# Patient Record
Sex: FEMALE | Race: WHITE | NOT HISPANIC OR LATINO | Employment: FULL TIME | URBAN - METROPOLITAN AREA
[De-identification: names, ages, dates, MRNs, and addresses within clinical notes are randomized per-mention and may not be internally consistent; named-entity substitution may affect disease eponyms.]

---

## 2022-01-22 ENCOUNTER — APPOINTMENT (OUTPATIENT)
Dept: ULTRASOUND IMAGING | Facility: CLINIC | Age: 32
End: 2022-01-22
Attending: EMERGENCY MEDICINE
Payer: COMMERCIAL

## 2022-01-22 ENCOUNTER — HOSPITAL ENCOUNTER (EMERGENCY)
Facility: CLINIC | Age: 32
Discharge: HOME | End: 2022-01-22
Attending: EMERGENCY MEDICINE | Admitting: EMERGENCY MEDICINE
Payer: COMMERCIAL

## 2022-01-22 VITALS
HEART RATE: 63 BPM | BODY MASS INDEX: 32.78 KG/M2 | WEIGHT: 185 LBS | RESPIRATION RATE: 18 BRPM | TEMPERATURE: 98.1 F | HEIGHT: 63 IN | SYSTOLIC BLOOD PRESSURE: 97 MMHG | DIASTOLIC BLOOD PRESSURE: 69 MMHG | OXYGEN SATURATION: 100 %

## 2022-01-22 DIAGNOSIS — N93.8 DYSFUNCTIONAL UTERINE BLEEDING: ICD-10-CM

## 2022-01-22 LAB
ANION GAP SERPL CALCULATED.3IONS-SCNC: 6 MMOL/L (ref 3–14)
B-HCG FREE SERPL-ACNC: <5 IU/L (ref 0–5)
BASOPHILS # BLD AUTO: 0 10E3/UL (ref 0–0.2)
BASOPHILS NFR BLD AUTO: 1 %
BUN SERPL-MCNC: 19 MG/DL (ref 7–30)
CALCIUM SERPL-MCNC: 9.5 MG/DL (ref 8.5–10.1)
CHLORIDE BLD-SCNC: 110 MMOL/L (ref 94–109)
CO2 SERPL-SCNC: 25 MMOL/L (ref 20–32)
CREAT SERPL-MCNC: 0.64 MG/DL (ref 0.52–1.04)
EOSINOPHIL # BLD AUTO: 0.1 10E3/UL (ref 0–0.7)
EOSINOPHIL NFR BLD AUTO: 2 %
ERYTHROCYTE [DISTWIDTH] IN BLOOD BY AUTOMATED COUNT: 12.7 % (ref 10–15)
GFR SERPL CREATININE-BSD FRML MDRD: >90 ML/MIN/1.73M2
GLUCOSE BLD-MCNC: 86 MG/DL (ref 70–99)
HCT VFR BLD AUTO: 48.2 % (ref 35–47)
HGB BLD-MCNC: 15.6 G/DL (ref 11.7–15.7)
IMM GRANULOCYTES # BLD: 0 10E3/UL
IMM GRANULOCYTES NFR BLD: 0 %
LYMPHOCYTES # BLD AUTO: 2.5 10E3/UL (ref 0.8–5.3)
LYMPHOCYTES NFR BLD AUTO: 33 %
MCH RBC QN AUTO: 29.7 PG (ref 26.5–33)
MCHC RBC AUTO-ENTMCNC: 32.4 G/DL (ref 31.5–36.5)
MCV RBC AUTO: 92 FL (ref 78–100)
MONOCYTES # BLD AUTO: 0.6 10E3/UL (ref 0–1.3)
MONOCYTES NFR BLD AUTO: 7 %
NEUTROPHILS # BLD AUTO: 4.3 10E3/UL (ref 1.6–8.3)
NEUTROPHILS NFR BLD AUTO: 57 %
NRBC # BLD AUTO: 0 10E3/UL
NRBC BLD AUTO-RTO: 0 /100
PLATELET # BLD AUTO: 267 10E3/UL (ref 150–450)
POTASSIUM BLD-SCNC: 3.8 MMOL/L (ref 3.4–5.3)
RBC # BLD AUTO: 5.25 10E6/UL (ref 3.8–5.2)
SODIUM SERPL-SCNC: 141 MMOL/L (ref 133–144)
WBC # BLD AUTO: 7.5 10E3/UL (ref 4–11)

## 2022-01-22 PROCEDURE — 36415 COLL VENOUS BLD VENIPUNCTURE: CPT | Performed by: EMERGENCY MEDICINE

## 2022-01-22 PROCEDURE — 85025 COMPLETE CBC W/AUTO DIFF WBC: CPT | Performed by: EMERGENCY MEDICINE

## 2022-01-22 PROCEDURE — 99284 EMERGENCY DEPT VISIT MOD MDM: CPT | Mod: 25

## 2022-01-22 PROCEDURE — 84702 CHORIONIC GONADOTROPIN TEST: CPT

## 2022-01-22 PROCEDURE — 82310 ASSAY OF CALCIUM: CPT | Performed by: EMERGENCY MEDICINE

## 2022-01-22 PROCEDURE — 76830 TRANSVAGINAL US NON-OB: CPT

## 2022-01-22 ASSESSMENT — ENCOUNTER SYMPTOMS
DYSURIA: 0
SHORTNESS OF BREATH: 0
VOMITING: 0

## 2022-01-22 ASSESSMENT — MIFFLIN-ST. JEOR: SCORE: 1533.28

## 2022-01-22 NOTE — ED TRIAGE NOTES
Pt. presents to ED with concerns for worsening vaginal bleeding and reports tearing feeling around her internal C section incision from 3 weeks ago. Pt. Reports she's been using toilet paper as a pad and is changing it every few hours. AVSS on RA. A & O x 4, independent. Reports a feeling of heaviness in her pelvic floor.

## 2022-01-23 NOTE — ED PROVIDER NOTES
"  History     Chief Complaint:  Vaginal Bleeding      HPI     Monserrat Moralez is a 31 year old female who presents with vaginal bleeding. Patient reports that she had a repeat  in 2021 in [place], [place]. She reports her pregnancy and delivery were uncomplicated. Approximately 3 to 4 weeks after delivery she began to note intermittent episodes of heavy vaginal bleeding. She had a 2 to 3-week episode of bleeding around [place] which eventually resolved without intervention and returned again today. She is uncertain how much bleeding she is having. She does not access to pads or tampons so she has been using toilet paper. She denies any associated vaginal discharge. She reports a tearing sensation in the vaginal and midline low abdomen area which waxes and wanes without alleviating or aggravating factors. She is currently living with family members in [place] and plans to stay in [place] at least for a number of years.    Review of Systems   Respiratory: Negative for shortness of breath.    Cardiovascular: Negative for chest pain.   Gastrointestinal: Negative for vomiting.   Genitourinary: Negative for dysuria and vaginal discharge.   Skin: Negative for rash.   All other systems reviewed and are negative.    Allergies:  Sulfa    Medications:    None    Past Medical History:    None    Past Surgical History:     x 2    Social History:  Presents to the ED alone    Physical Exam     Patient Vitals for the past 24 hrs:   BP Temp Temp src Pulse Resp SpO2 Height Weight   22 1735 106/76 98.1  F (36.7  C) Temporal 73 16 100 % 1.6 m (5' 3\") 83.9 kg (185 lb)       Physical Exam    Eyes:    Conjunctiva normal  Neck:    Supple, no meningismus.     CV:     Regular rate and rhythm.      No murmurs, rubs or gallops.     No lower extremity edema.  PULM:    Clear to auscultation bilateral.       No respiratory distress.      Good air exchange.     No rales or wheezing  ABD:    Soft, " non-distended.       Minimal tenderness in the midline low abdomen     Reducible umbilical hernia.     Bowel sounds normal.     No rebound, guarding or rigidity.     No CVA tenderness.   :    Normal external genitalia.     No perineal lesions.     Trace amount of blood in the in the vaginal vault.     No clots present.     No vaginal discharge.  MSK:     No gross deformity to all four extremities.   LYMPH:   No cervical lymphadenopathy.  NEURO:   Alert.  Good muscular tone, no atrophy.   Skin:    Warm, dry and intact.    Psych:    Mood is good and affect is appropriate.      Emergency Department Course     Imaging:  US Pelvic Complete with Transvaginal   Final Result   IMPRESSION:   Unremarkable pelvic ultrasound. No cause for pelvic pain is identified.                   Laboratory:  Labs Ordered and Resulted from Time of ED Arrival to Time of ED Departure   BASIC METABOLIC PANEL - Abnormal       Result Value    Sodium 141      Potassium 3.8      Chloride 110 (*)     Carbon Dioxide (CO2) 25      Anion Gap 6      Urea Nitrogen 19      Creatinine 0.64      Calcium 9.5      Glucose 86      GFR Estimate >90     CBC WITH PLATELETS AND DIFFERENTIAL - Abnormal    WBC Count 7.5      RBC Count 5.25 (*)     Hemoglobin 15.6      Hematocrit 48.2 (*)     MCV 92      MCH 29.7      MCHC 32.4      RDW 12.7      Platelet Count 267      % Neutrophils 57      % Lymphocytes 33      % Monocytes 7      % Eosinophils 2      % Basophils 1      % Immature Granulocytes 0      NRBCs per 100 WBC 0      Absolute Neutrophils 4.3      Absolute Lymphocytes 2.5      Absolute Monocytes 0.6      Absolute Eosinophils 0.1      Absolute Basophils 0.0      Absolute Immature Granulocytes 0.0      Absolute NRBCs 0.0     ISTAT HCG QUANTITATIVE PREGNANCY POCT - Normal    HCG QUANTITATIVE POCT <5.0       Emergency Department Course:  Reviewed:  I reviewed nursing notes and vitals    Assessments:   I obtained history and examined the patient as noted above.  "   I rechecked the patient and explained findings.     Disposition:  The patient was discharged to home.    Impression & Plan        Medical Decision Makin-year-old female presented to the ED with irregular heavy vaginal bleeding since her  in 2021.  Patient is currently not pregnant.  She is not on any anticoagulants.  No evidence of thrombocytopenia or bleeding diathesis.  Pelvic exam is unremarkable with no significant active bleeding.  Pelvic ultrasound ruled out structural issues.  Evaluation is consistent with dysfunctional uterine bleeding.      We discussed options for contraceptives.  I recommended oral contraceptives but she reports that she has experienced \"side effects.\"  I recommended follow-up with gynecology to have discussion regarding alternative agents including IUD but she reports that her sister had that and \"I would never have that done.\"  She also reports complications with Nexplanon.  Patient will benefit from outpatient follow-up with gynecology for further discussion of agents to assist with dysfunctional uterine bleeding as she has declined oral contraceptives from the emergency department.    Diagnosis:    ICD-10-CM    1. Dysfunctional uterine bleeding  N93.8        Discharge Medications:  New Prescriptions    No medications on file          Rohith Ramirez MD  22    "

## 2023-06-29 ENCOUNTER — TRANSCRIBE ORDERS (OUTPATIENT)
Dept: OTHER | Age: 33
End: 2023-06-29

## 2023-06-29 DIAGNOSIS — Z98.890 HISTORY OF LARYNGOPLASTY: Primary | ICD-10-CM

## 2023-06-29 DIAGNOSIS — J38.3 VOCAL CORD DYSFUNCTION: ICD-10-CM

## 2023-10-04 NOTE — TELEPHONE ENCOUNTER
FUTURE VISIT INFORMATION:      FUTURE VISIT INFORMATION:  Date: 12/8/23  Time: 11 AM  [place] [facility] [place]  REFERRAL INFORMATION:  Referring provider: Rob Marks MD   Referring providers clinic: [facility]   Reason for visit/diagnosis:  History of laryngoplasty [Z98.890]; Vocal cord dysfunction [J38.3]  Ref by  Dr Rob Aguiar  in [place]     RECORDS REQUESTED FROM:       Clinic name Comments Records Status Imaging Status   [facility]  6/29/23 referral/ note- Rob Marks MD  [place]

## 2023-12-07 NOTE — PROGRESS NOTES
"[facility]  Evaluation report    Clinician: Kevin Stafford M.M., M.A., CCC/SLP  Seen in conjunction with: Dr. Mathews  Referring physician:  Dr. Marks  Patient: Monserrat Moralez  Date of Visit: 12/8/2023    HISTORY  Chief complaint: Monserrat Moralez is a 33 year old woman presenting today for evaluation of voice.    Chart Review: She has a history significant for spastic cerebral palsy.  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  Referring records are not visible; but what information is present points to ongoing hoarseness and a desire to determine what avenues for intervention may be open.    Today she reports that her first surgery was done in either [place] or [facility] to aid in her breathing, and was felt to be successful.  Voice was a challenge for her as she aged and at 17 she had a second surgery to \"bring the cords together\" however there was an ongoing vertical height mismatch and her voice continues to be rough to this today.  She is chosen to reinitiate at this point to determine if there are additional avenues for intervention that can be done, and as she has better health insurance to support participating in speech therapy which was previously recommended but could not be pursued due to significant life complications.    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  Finds her voice drains with use  Will progress to full voice loss after 1-1/2 hours of consistent use  Is able to avoid extraneous use when possible, but approximately once every 14 days she will lose her voice    COUGH/THROAT " "CLEARING  Currently feels like she has an illness  Frequent throat clearing  Associated with a sensation of drainage  When mucus is worse the voice wears out faster    SWALLOWING  Food gets hung up in her throat  Uses smaller bites adapts food type  Once a week or so  Rarely feels like it goes down the wrong pipe    BREATHING  Will be limited with faster walking  Feels it takes longer to both exhale and inhale compared to friends  Winded more easily  Had Asthma as a child  No noisy breathing    ADDITIONAL  Throat discomfort  Localized in her neck  \"Cords feel irritated a lot\"  Background pain is always there but worsens with use  Reflux  Once per month usually associated with eating / drinking before laying down.    OBJECTIVE    PERCEPTUAL EVALUATION (CPT 88487)  POSTURE / TENSION:   neck and shoulders    BREATHING:   Intermittent inspiratory noise noted    VOICE:  Roughness: Severe Consistent with plicae ventricularis  Breathiness: Mild to moderate Consistent  Strain: Severe Consistent  Loudness  Conversational speech:  Mildly reduced  Projected speech:  Moderately reduced  Pitch:  Conversational speech:  minimal perceived pitch  Pitch glide: greatly reduced pitch range  Resonance:  Conversational speech:  laryngeal pharyngeal resonance  Singing vs. Speech:  consistent across contexts  CAPE- Overall Severity:  73/100    COUGH/THROAT CLEARING:  Not observed    LARYNGEAL EXAMINATION  Procedure: Flexible endoscopy with chip-tip technology without stroboscopy, left nostril; topical anesthesia with 3% Lidocaine and 0.25% phenylephrine was not applied.   Performed by: Dr. June Mathews  The laryngeal and pharyngeal structures were evaluated for gross appearance, mobility, function, and focal lesions / abnormalities of the associated mucosa.  Stroboscopy was warranted to evaluate closure, symmetry, and vibratory characteristics of the vocal folds.  All findings were within normal limits with the exception of the following " "salient features:   Essentially healthy mucosa  Vocal folds appear grossly smooth and straight  Brisk bilateral motion in AB and ADduction  Anterior commissure is notable for the vocal folds appearing to be jointed with a multi-milimeter gap  With phonatory tasks there is severe ventricular recruitment  No meaningful change in glottic configuration with the use of therapeutic probes across forward resonance, high flow, or semiocclusion  When asked to imitate inspiratory stridor patient stated that this is consistent with her breathing feels as she exerts herself at work  Best abduction of the vocal folds was achieved with pursed lip/straw inhalation versus nasal inhalation  Stroboscopy is warranted but is not tenable due to the degree of supraglottic hyperfunction limiting glottic view    The laryngeal exam was reviewed with Ms. Moralez, and I provided pertinent explanations, as well as written and oral information.  ____________________________________________________________________  Laryngeal Function Studies (CPT 52187)  Acoustic measures:  Acoustic Analyses of Voice  Fundamental frequency Metrics  /a/ mean F0 = 272 Hz (SD = 14.76 Hz)  /i/ mean F0 = 241 Hz (SD = 60.79 Hz)  Amboy Passage Mean f0 = 170 Hz (SD 48.84 Hz)  Glide:   Min F0 = 56 Hz  Max F0 = 608 Hz  Range = 552 Hz  Notes = glide should be interpreted cautiously due to aperiodic sounds  Cepstral Measures  CPPS /a/ = 16.11 dB  CPPS /i/ = 15.49 dB  CPPS \"all voiced\" = 14.61 dB  AVQI (v.3.01) = 7.41  Additional Measures  Harmonic to Noise Ratio /a/ = 14.10 dB  Harmonic to Noise Ratio: Amboy passage = 5.94 dB  Jitter (local) /a/ = 1.172 %  Shimmer (local) /a/ = 7.823 %      Aerodynamic Measures     Protocol / Parameter Result Normative Mean (SD)   Vital Capacity     Expiratory Volume 3.22 Liters 3.08 (0.57) Liters   Comfortable Sustained Phonation     Mean SPL 71.34 dB 77.33 (5.07) dB   Mean Pitch 175.23 Hz 212.55 (21.98) Hz   Peak Expiratory Airflow " 0.395 Lit/Sec 0.2 (0.11) Lit/Sec   Mean Expiratory Airflow 0.296 Lit/Sec 0.13 (0.08) Lit/Sec   Voicing Efficiency     Peak Air Pressure 19.42 cm H2O 6.65 (1.96) cm H2O   Mean Peak Air Pressure 17.27 cm H2O 5.57 (1.72) cm H2O   Peak Expiratory Airflow 0.381 Lit/Sec 0.19 (0.1) Lit/Sec   Mean Airflow During Voicing 0.314 Lit/Sec 0.11 (0.05) Lit/Sec   Running Speech: All Voiced Stimulus     Mean SPL 66.59 dB    Mean Pitch 184.49 Hz    Peak Expiratory Airflow 0.547 Lit/Sec    Mean Expiratory Airflow 0.301 Lit/Sec    Mean Airflow During Voicing 0.322 Lit/Sec    Running Speech: Grandfather Passage     Mean SPL 59.39 dB    SPL Range 43.17 dB    Mean Pitch 184.13 Hz    Pitch Range 255.97 Hz    Peak Expiratory Airflow 1.126 Lit/Sec    Mean Expiratory Airflow 0.359 Lit/Sec    Expiratory Volume 8.89 Liters    Mean Airflow During Voicing 0.312 Lit/Sec    Peak Inspiratory Airflow -2.005 Lit/Sec    Inspiratory Volume -7.99 Liters    ____________________________________________________________________    ASSESSMENT / PLAN  IMPRESSIONS: Monserrat Moralez is presenting today with lifelong voice changes secondary to laryngeal reconstruction and subsequent revision at ages 5 and 17 respectively, which 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 Laryngeal evaluation shows significant ongoing changes to the anterior commissure with approximately 4 to 5 mm of space  the anterior most portion of the left and right vocal folds with likely vertical height mismatch.  Although true phonation is appreciated particularly with elevated pitch, primary source of phonation for communication happens via vibration of the ventricular folds.  This explains audio perceptual quality which is dominated by roughness, strain, and breathiness which is in  turn echoed by laryngeal function studies which show substantially increased trans glottal airflow, profoundly increased in for subglottal pressure, and significantly elevated AVQI.  Beyond voice quality patient's episodic breathing difficulties during exertion likely have a role of paradoxical vocal fold motion, and she was stimulable for improvement in abduction with rescue breathing strategies (pursed lip breathing).    DETAILED RECOMMENDATIONS:   We discussed that neither surgical nor behavioral intervention is likely to bring voice quality to normal level given the significance of the changes to the level of her larynx; however, optimization of the system has a potential to improve endurance, decrease episodic breathing difficulties, and likely aid in improved projection.  Dr. Mathews is recommending injection augmentation to improve glottic closure, and adjuvant speech therapy is recommended to optimize laryngeal airway for exertional breathing, and achieve best procedural outcomes through optimizing patterns of voicing  She demonstrates a fair prognosis for improvement given adherence to therapeutic recommendations.   Positive indicators: high level of comittment  Negative indicators: Significant structural change to level of the larynx  DURATION / FREQUENCY: 6 biweekly and 2 monthly one-hour sessions  For session was accomplished today with my colleague Tika Freeman CCC-SLP  Research: This patient is not a candidate    GOALS:  Patient goal:   To understand the problem and fix it as much as possible    Short-term goal(s): Within the first 4 sessions, Ms. Moralez:  will utilize silent inhalation with good low-respiratory engagement 75% of the time during therapy tasks with minimal clinician support  will accurately identify target vs. habitual voice quality during therapy tasks in 4 out of 5 trials with no clinician support  will demonstrate the ability to alternate between target and habitual voice quality  given clinician cue 75% of the time during therapy tasks  Will demonstrate rescue breathing strategies with 100% accuracy and no clinician support    Long-term goal(s): In 6 months, Ms. Moralez will:  Report a week of typical activities, in which dysphonia does not exceed a level of 6 out of 10, 80% of the time    This treatment plan was developed with the patient who agreed with the recommendations.    TOTAL SERVICE TIME: 90 minutes  EVALUATION OF VOICE AND RESONANCE (23908)  LARYNGEAL FUNCTION STUDIES (59717)  NO CHARGE FACILITY FEE (40584)    Keivn Stafford M.M., M.A., CCC-SLP  Speech-Language Pathologist  Certificate of Vocology  [phone]    *this report was created in part through the use of computerized dictation software, and though reviewed following completion, some typographic errors may persist.  If there is confusion regarding any of this notes contents, please contact me for clarification.*

## 2023-12-08 ENCOUNTER — VIRTUAL VISIT (OUTPATIENT)
Dept: OTOLARYNGOLOGY | Facility: CLINIC | Age: 33
End: 2023-12-08
Payer: COMMERCIAL

## 2023-12-08 ENCOUNTER — TELEPHONE (OUTPATIENT)
Dept: OTOLARYNGOLOGY | Facility: CLINIC | Age: 33
End: 2023-12-08

## 2023-12-08 ENCOUNTER — PRE VISIT (OUTPATIENT)
Dept: OTOLARYNGOLOGY | Facility: CLINIC | Age: 33
End: 2023-12-08

## 2023-12-08 ENCOUNTER — OFFICE VISIT (OUTPATIENT)
Dept: OTOLARYNGOLOGY | Facility: CLINIC | Age: 33
End: 2023-12-08
Payer: COMMERCIAL

## 2023-12-08 ENCOUNTER — ANCILLARY PROCEDURE (OUTPATIENT)
Dept: CT IMAGING | Facility: CLINIC | Age: 33
End: 2023-12-08
Attending: OTOLARYNGOLOGY
Payer: COMMERCIAL

## 2023-12-08 ENCOUNTER — THERAPY VISIT (OUTPATIENT)
Dept: SPEECH THERAPY | Facility: CLINIC | Age: 33
End: 2023-12-08
Payer: COMMERCIAL

## 2023-12-08 ENCOUNTER — PREP FOR PROCEDURE (OUTPATIENT)
Dept: OTOLARYNGOLOGY | Facility: CLINIC | Age: 33
End: 2023-12-08

## 2023-12-08 VITALS
SYSTOLIC BLOOD PRESSURE: 101 MMHG | BODY MASS INDEX: 31.71 KG/M2 | HEART RATE: 76 BPM | WEIGHT: 179 LBS | DIASTOLIC BLOOD PRESSURE: 62 MMHG

## 2023-12-08 DIAGNOSIS — R49.0 DYSPHONIA: Primary | ICD-10-CM

## 2023-12-08 DIAGNOSIS — R13.12 OROPHARYNGEAL DYSPHAGIA: Primary | ICD-10-CM

## 2023-12-08 DIAGNOSIS — J38.3 GLOTTIC INSUFFICIENCY: ICD-10-CM

## 2023-12-08 DIAGNOSIS — J38.3 VOCAL CORD DYSFUNCTION: ICD-10-CM

## 2023-12-08 DIAGNOSIS — Z98.890 HISTORY OF LARYNGOPLASTY: ICD-10-CM

## 2023-12-08 DIAGNOSIS — J38.3 PARADOXICAL VOCAL FOLD MOVEMENT: ICD-10-CM

## 2023-12-08 DIAGNOSIS — J38.3 GLOTTIC INSUFFICIENCY: Primary | ICD-10-CM

## 2023-12-08 PROCEDURE — 92520 LARYNGEAL FUNCTION STUDIES: CPT | Mod: GN | Performed by: SPEECH-LANGUAGE PATHOLOGIST

## 2023-12-08 PROCEDURE — 92612 ENDOSCOPY SWALLOW (FEES) VID: CPT | Mod: GN | Performed by: OTOLARYNGOLOGY

## 2023-12-08 PROCEDURE — 92613 ENDOSCOPY SWALLOW (FEES) I&R: CPT | Performed by: OTOLARYNGOLOGY

## 2023-12-08 PROCEDURE — 92524 BEHAVRAL QUALIT ANALYS VOICE: CPT | Mod: GN | Performed by: SPEECH-LANGUAGE PATHOLOGIST

## 2023-12-08 PROCEDURE — 92610 EVALUATE SWALLOWING FUNCTION: CPT | Mod: GN | Performed by: SPEECH-LANGUAGE PATHOLOGIST

## 2023-12-08 PROCEDURE — 99204 OFFICE O/P NEW MOD 45 MIN: CPT | Mod: 25 | Performed by: OTOLARYNGOLOGY

## 2023-12-08 PROCEDURE — 92507 TX SP LANG VOICE COMM INDIV: CPT | Mod: GN | Performed by: SPEECH-LANGUAGE PATHOLOGIST

## 2023-12-08 PROCEDURE — 70490 CT SOFT TISSUE NECK W/O DYE: CPT | Mod: GC | Performed by: RADIOLOGY

## 2023-12-08 ASSESSMENT — PAIN SCALES - GENERAL: PAINLEVEL: MILD PAIN (2)

## 2023-12-08 NOTE — PROGRESS NOTES
[facility]   at the [facility]   Otolaryngology Clinic     Patient: Monserrat Moralez    MRN: [id number]    : 1990    Age/Gender: 33 year old female  Date of Service: 2023  Rendering Provider:   June Mathews MD     Referring Provider   PCP: No Ref-Primary, Physician  Referring Physician: Rob Marks MD  [address]  [place] [address]  Reason for Consultation   Dysphonia  History   HISTORY OF PRESENT ILLNESS: Ms. Moralez is a 33 year old female who presents to us today with dysphonia.      Of note she had a thyroplasty at 17 and a reconstruction surgery at age 5.    she presents today with her partner for evaluation. she reports:    Dysphonia    - reconstruction surgery at age 5 in [place]  - thyroplasty at age 17- helped her voice at that time  - currently feels like a lot of effort with the voice  - if she loses her voice, it's gone for the rest of the day  - works at [employer], which is a strain on her voice  - swallowing gets hung up on solid foods  - meats, eggs, cereal, noodles towards the upper part of her throat  - she works on clearing her throat to help better  - breathing gets impacted, when she's walking  - her breathing doesn't get very noisy- some level of wheezing  - denies COPD, but has had asthma in the past- doesn't use inhalers  - her partner says he had trouble in the beginning understanding her with her voice        PAST MEDICAL HISTORY: No past medical history on file.    PAST SURGICAL HISTORY: No past surgical history on file.    CURRENT MEDICATIONS: No current outpatient medications on file.    ALLERGIES: Sulfa antibiotics    SOCIAL HISTORY:    Social History     Socioeconomic History    Marital status:      Spouse name: Not on file    Number of children: Not on file    Years of education: Not on file    Highest education level: Not on file   Occupational History    Not on file   Tobacco Use    Smoking status:  Not on file    Smokeless tobacco: Not on file   Substance and Sexual Activity    Alcohol use: Not on file    Drug use: Not on file    Sexual activity: Not on file   Other Topics Concern    Not on file   Social History Narrative    Not on file     Social Determinants of Health     Financial Resource Strain: Not on file   Food Insecurity: Not on file   Transportation Needs: Not on file   Physical Activity: Not on file   Stress: Not on file   Social Connections: Not on file   Interpersonal Safety: Not on file   Housing Stability: Not on file         FAMILY HISTORY: No family history on file.  Non-contributory for problems with anesthesia    REVIEW OF SYSTEMS:   The patient was asked a 14 point review of systems regarding constitutional symptoms, eye symptoms, ears, nose, mouth, throat symptoms, cardiovascular symptoms, respiratory symptoms, gastrointestinal symptoms, genitourinary symptoms, musculoskeletal symptoms, integumentary symptoms, neurological symptoms, psychiatric symptoms, endocrine symptoms, hematologic/lymphatic symptoms, and allergic/ immunologic symptoms.   The pertinent factors have been included in the HPI and below.  Patient Supplied Answers to Review of Systems       No data to display                Physical Examination   The patient underwent a physical examination as described below. The pertinent positive and negative findings are summarized after the description of the examination.  Constitutional: The patient's developmental and nutritional status was assessed. The patient's voice quality was assessed.  Head and Face: The head and face were inspected for deformities. The sinuses were palpated. The salivary glands were palpated. Facial muscle strength was assessed bilaterally.  Eyes: Extraocular movements and primary gaze alignment were assessed.  Ears, Nose, Mouth and Throat: The ears and nose were examined for deformities. The nasal septum, mucosa, and turbinates were inspected by anterior  rhinoscopy. The lips, teeth, and gums were examined for abnormalities. The oral mucosa, tongue, palate, tonsils, lateral and posterior pharynx were inspected for the presence of asymmetry or mucosal lesions.    Neck: The tracheal position was noted, and the neck mass palpated to determine if there were any asymmetries, abnormal neck masses, thyromegally, or thyroid nodules.  Respiratory: The nature of the breathing and chest expansion/symmetry was observed.  Cardiovascular: The patient was examined to determine the presence of any edema or jugular venous distension.  Abdomen: The contour of the abdomen was noted.  Lymphatic: The patient was examined for infraclavicular lymphadenopathy.  Musculoskeletal: The patient was inspected for the presence of skeletal deformities.  Extremities: The extremities were examined for any clubbing or cyanosis.  Skin: The skin was examined for inflammatory or neoplastic conditions.  Neurologic: The patient's orientation, mood, and affect were noted. The cranial nerve  functions were examined.  Other pertinent positive and negative findings on physical examination:      OC/OP: no lesions, uvula midline, soft palate elevates symmetrically   Neck: no lesions, bilateral TH tenderness to palpation, bilateral neck incision with hypotrophic scarring  All other physical examination findings were within normal limits and noncontributory.  Procedures     Flexible laryngoscopy (CPT 14595)      Pre-procedure diagnosis: dysphonia  Post-procedure diagnosis: same as above  Indication for procedure: Ms. Moralez is a 33 year old female with see above  Procedure(s): Fiberoptic Laryngoscopy    Details of Procedure: After informed consent was obtained, the patient was seated in the examination chair.  The areas of the nasopharynx as well as the hypopharynx were anesthetized with topical 4% lidocaine with 0.25% phenylephrine atomizer.  Examination of the base of tongue was performed first.  Attention was  directed to any evidence of masses in the area or evidence of leukoplakia or candidal infection.  Attention was directed to the epiglottis where its size and position was determined and its movement on phonation of the vowel  e .  The piriform sinuses were then inspected for any mass lesions or pooling of secretions.  Attention was then directed to the larynx. The vocal folds were inspected for infection or any areas of leukoplakia, for masses, polypoid degeneration, or hemorrhage.  Having done this, the arytenoids and vocal processes were inspected for erythema or evidence of granuloma formation.  The posterior commissure was then inspected for evidence of inflammatory changes in the mucosa and heaping up of mucosal tissue. The patient was then instructed to say the vowel  e .  Adduction of vocal folds to the midline was observed for any evidence of paresis or paralysis of the larynx or asymmetry in rotation of the larynx to the left or right. The patient was asked to breathe and the degree of abduction was noted bilaterally.  Subglottic view of the larynx was obtained for any additional mass lesions or mucosal changes.  Finally the post cricoid was examined for evidence of pooling of secretions, as well as the pharyngeal wall mucosa.   Anesthesia type: 0.25% phenylephrine    Findings:  Anatomic/physiological deviations: LNC, anterior commissure is not sharp, right vocal fold insertion is posterior and lower than the left vocal fold, very large glottic gap, uses ventricular phonation, pediatric scope   Right vocal process: No restriction of mobility   Left vocal process: No restriction of mobility  Glottal gap: Glottal gap  Supraglottic structures: Normal  Hypopharynx: Normal     Estimated Blood Loss: minimal  Complications: None  Disposition: Patient tolerated the procedure well                 Fiberoptic Endoscopic Evaluation of Swallowing (CPT 40512)  and Interpretation of Swallowing (CPT 37738)    Indications:  See above notes for complete history and physical. Patient complains of dysphagia to both solids and liquids and/or there is suggestion on history and endoscopic exam of the presence of dysphagia causing medical complaints.  Swallowing evaluation is being performed to assess the presence and degree of dysphagia, and to recommend a safe diet.     Pulmonary Status:  No PNA   Current Diet:              regular                                        thin liquids      Consistency Amounts:  Thin Liquid: sip   Puree: sip  Solid: cookies            Positioning: upright in a chair  Oral Peripheral Exam: See physical exam section.  Anatomic Notes: See Videostroboscopy report for assessment of anatomy and laryngeal functioning  Pharyngeal secretions prior to administration of liquid or food: No   Oral Phase Abnormal Findings: No abnormal behavior observed   Pharyngeal Phase Abnormal Findings:  mild vallecular residue with hard boiled egg, cleared with liquid wash     Recommended Diet:  regular                                        thin liquids            Review of Relevant Clinical Data   I personally reviewed:  Notes: Dr. Rob Marks 6/29/23, ENT-Otol  Assessment/Plan: 32 y.o. female with complicated history of laryngeal reconstruction as a child/teenager, and long-standing symptoms of dysphonia and dyspnea on exertion. Her laryngeal exam today shows fully mobility of her vocal cords without any evidence of laryngeal/subglottic stenosis or arytenoid fixation. Based on her degree of dysphonia, suspect there is likely height mismatch of her vocal processes though I am not able confirm that on our portable laryngoscopy today.      Her main goal is to improve dysphonia, and secondary goal is to improve her TYSON. We discussed referral to SLP to assist with her voice outcome. Given complexity will refer to tertiary center (discussed [facility] vs [facility], recommend [facility]). Will also recommend evaluation with Laryngology. Do  "not think there is an obvious surgical intervention, but given complexity and prior surgical history it would be worthwhile to hear Laryngology's input. She can f/u with me PRN.    Labs:  No results found for: \"TSH\"  Lab Results   Component Value Date     01/22/2022    CO2 25 01/22/2022    BUN 19 01/22/2022     Lab Results   Component Value Date    WBC 7.5 01/22/2022    HGB 15.6 01/22/2022    HCT 48.2 (H) 01/22/2022    MCV 92 01/22/2022     01/22/2022     No results found for: \"PT\", \"PTT\", \"INR\"  No results found for: \"LEDY\"  No components found for: \"RHEUMATOIDFACTOR\", \"RF\"  No results found for: \"CRP\"  No components found for: \"CKTOT\", \"URICACID\"  No components found for: \"C3\", \"C4\", \"DSDNAAB\", \"NDNAABIFA\"  No results found for: \"MPOAB\"    Patient reported Quality of Life (QOL) Measures   Patient Supplied Answers To VHI Questionnaire       No data to display                  Patient Supplied Answers To EAT Questionnaire       No data to display                  Patient Supplied Answers To CSI Questionnaire       No data to display                  Patient Supplied Answers to Dyspnea Index Questionnaire:       No data to display                Impression & Plan     IMPRESSION: Ms. Moralez is a 33 year old female who is being seen for the following:    Dysphonia  - in the setting of 5 year old reconstruction and 17 year old medialization   - loses voice after 1.5 hours  -  worse with exertion of her voice   - speaking voice is affected   - singing voice is affected   -  pain with voice use  - voice demand is high, as she works at [employer]  - scope evaluation shows anterior commissure is not sharp, right vocal fold insertion is posterior and lower than the left vocal fold, very large glottic gap, uses ventricular phonation   - symptoms due to glottic gap from prior reconstructive surgery  - dicussed we can trial bilateral vocal fold injections with voice gel to see if this can improve her voice  - discussed " limited benefits vs no benefits, discussed optimizing her breathing as well given vocal fold dysfunction, if the injection is helpful, then proceed with a fat injection as a long term solution  - do not see evidence of prior implant today, will need complete imaging, and obtain records  Plan  - neck CT scan w/o contrast  - voice therapy, need one session before injection  - obtain records of prior thyroplasty  - schedule trial vocal fold injections      2. Dysphagia  - in the setting of 5 year old reconstruction and 17 year old medialization  - solids, such as meats, eggs, cereal, and noodles, get hung up in the upper part of her throat  - throat clearing and drinking water normally helps  - frequency: all of the time   - pneumonias denies   - weight changes denies   - reflux denies  - GI work up denies  - imaging work up denies   - scope shows anterior commissure is not sharp, right vocal fold insertion is posterior and lower than the left vocal fold, very large glottic gap, uses ventricular phonation  - FEES shows mild vallecular residue with hard boiled egg, cleared with liquid wash    - symptoms due to muscle tension dysphagia    Plan   - safe swallow precaution    RETURN VISIT: after trial injection     Thank you for the kind referral and for allowing me to share in the care of Ms. Moralez. If you have any questions, please do not hesitate to contact me.    Scribe Disclosure:   I, Eryn Howe, am serving as a scribe; to document services personally performed by June Mathews MD -based on data collection and the provider's statements to me.     Provider Disclosure:  I agree with above History, Review of Systems, Physical exam and Plan.  I have reviewed the content of the documentation and have edited it as needed. I have personally performed the services documented here and the documentation accurately represents those services and the decisions I have made.        June Mathews MD     Laryngology    [facility]  Department of  Otolaryngology - Head and Neck Surgery  Clinics & Surgery Center  [address] [place] [id number]  Appointment line: [phone]  Fax: [phone]  [email or web address]

## 2023-12-08 NOTE — LETTER
12/8/2023       RE: Monserrat Moralez  [id number] [address] [id number]     Dear Colleague,    Thank you for referring your patient, Monserrat Moralez, to the [facility]ICE CL[facility]IC [facility]. Please see a copy of my visit note below.    Monserrat Moralez is a 33 year old female who is being evaluated via a billable video visit.      Monserrat has been notified and verbally consented to the following:   This video visit will be conducted between you and your provider.  Patient has opted to conduct today's video visit vs an in-person appointment.   Video visits are billed at different rates depending on your insurance coverage. Please reach out to your insurance provider with any questions.   If during the course of the call the provider feels the appointment is not appropriate, you will not be charged for this service.  Provider has received verbal consent for billable virtual visit from the patient? Yes  Will anyone else be joining your video visit? Patient's significant other    Call initiated at: 01:00 pm   Type of Visit Platform Used: [employer]  Location of provider: [facility]  Location of patient: [facility]  Harsh Mcfarland Jr., M.D., F.A.C.S.  Linda Sheldon M.D., M.P.H.  June Mathews M.D.  June Hale M.M. (voice), M.A., CCC-SLP  Tika Freeman MVICTOR HUGO., CCC-SLP  Beata Jones, Ph.D., CCC-SLP  Phillip Fitch, Ph.D., CCC-SLP  Meme Abad, M.S., CCC-SLP  Terrance Stafford M.M., M.A., CCC-SLP  ROBYN Hummel (voice), M.S., CCC-SLP    [facility]  VOICE/SPEECH/BREATHING THERAPY PROGRESS REPORT    Patient: Monserrat Moralez  Date of Service: 12/8/2023    Date of Last Service: 12/08/2023  Referring physician: Dr. Mathews  Please see Terrance Stafford M.A., CCC-SLP's note for full impressions from initial evaluation today (12/08/2023).     I had the pleasure of seeing Ms. 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 Moralez worked on therapeutic activities to address the above diagnosis.    Ms. Moralez was seen for evaluation on 12/08/2023.  At that time, it was determined that  she would benefit from a course of speech therapy to address the above diagnosis.      Ms. Moralez presents today with the following:  Voice quality:  Moderate-severe roughness  Mild-moderate strain  Mild-moderate breathiness    THERAPEUTIC ACTIVITIES  Today Ms. Moralez participated in the following therapeutic activities:  Asked many questions about the nature of her symptoms, and I answered all of these thoroughly.  [place] techniques for optimal breathing in all situations  I provided instructions for rescue breathing strategies  I provided techniques for respiratory timing with exercise.  [place] exercises to add phonation to the optimal flowing airstream.  Semi-occluded vocal tract exercises with a straw (\"cup and bubbles\"), straw phonation, lip trills, Buzzy Z, trumpet, and humming (smile and yawn posture) instructions were provided.  The lip trills, Buzzy Z, humming, and trumpet were the most facilitating  Instructed to use as a voice warm up, cool down, coordination of breath flow with phonation, as frequent voice resets throughout the day  good learning, but will need practice   She reports feeling a buzz at her lips and some tension in her throat or a \"buzz in the throat.\" Additionally, the forward buzz tickles her nose.   [place] concepts of an optimal regimen for practice.  she should use an interval schedule of practice, with brief periods of practice frequently throughout each day  I provided an after visit summary to help facilitate practice.    IMPRESSIONS/GOALS/PLAN  Ms. 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     She will continue to work on her exercises on a daily basis, and work on incorporating the techniques into her daily activities.    Progress toward long-term goals:   Minimal at this point, as this is first session, but good learning today    Goals for this practice period:   practice all exercises according to instructions  incorporate techniques into daily vocal activities    Plan: Ms. Moralez will see Dr. Jones on January 5, 2024 to work on education, modification, and carryover of therapeutic activities to more complex activities.    TOTAL SERVICE TIME: 60 minutes  TREATMENT (59[address])  NO CHARGE FACILITY FEE    Tika Freeman M.A., CCC-SLP  Speech-Language Pathologist  [facility]  [email or web address]  She/Her/Hers     Speech recognition software may have been used in this documentation; input is reviewed before signature to the best of my ability.       Again, thank you for allowing me to participate in the care of your patient.      Sincerely,    Tika Freeman, SLP

## 2023-12-08 NOTE — PATIENT INSTRUCTIONS
Endoscopic Swallow Study Results    Problem Identified: Mild residuals in the valleculae (space behind the tongue) noted after completed swallow on hard boiled egg. Pt was able to clear with sips of liquid. No aspiration/penetration noted on any consistency presented (nothing went into the airway).     Diet Recommended: Regular solid and thin liquids    Swallowing Suggestions:  Sit upright at 90 degrees  Eat slowly  Chew carefully  Do not talk while chewing or swallowing  Small bites  Alternate liquids and solids

## 2023-12-08 NOTE — PATIENT INSTRUCTIONS
"Hilton German,    Here is everything we discussed.    Tika Freeman M.A., CCC-SLP  Speech-Language Pathologist  [facility]  [email or web address]  She/Her/Hers     Rescue Breathing Strategies: Do these as needed when you feel short of breath  Inhalation   Do 3 quick puppy sniffs in through the nose  Inhale slowly through the nose like you are smelling cookies  Inhale slowly through rounded lips like you are silently slurping a spaghetti noodle or a thin straw  Inhale slowly through a thin straw (Justice's straw or smaller)  Place the tip of your tongue at the roof of you mouth and inhale. You will sound like Darsal Swati. You will feel the air rush by the sides of your tongue.   Exhalation  Exhale on a \"Sh\" like you are shushing bad kids  Exhale like you are blowing candles on a cake.       Respiratory Pacing Techniques with Exercise  When running, inhale for 4 counts and exhale for 4, 6, or 8 counts. You can inhale through rounded lips and exhale on \"sh\" or like you are blowing out candles. Or you can do the 3 quick puppy sniffs in and exhale for 4 counts (or longer like 6-8 counts).   When biking, inhale for 4 counts and exhale for 4, 6, or 8 counts. You can inhale through rounded lips and exhale on \"sh\" or like you are blowing out candles. Or you can do the 3 quick puppy sniffs in and exhale for 4 counts (or longer like 6-8 counts).       SEMI-OCCLUDED VOCAL TRACT EXERCISES: 5-7x a day for 2-3 minutes.    If you don't have straw or cup you can do lip trills, Buzzy Z (like a bee), elephant (puff air in your upper lip), or humming. You can also use the straw alone.     You did the best with the lip trills, then the Buzzy Z, the humming (smile or yawn), then the trumpet. If you feel that you are straining or pushing from your throat, STOP.    Cup and Bubble Exercises   WHY: Though these exercises seems (and feels) silly they are helpful for a number of reasons. First, they make you use your air " "generously and consistently, helping you to coordinate your breath and your voice. Second, they lengthen and narrow the vocal tract with the straw. This narrowing (or semi-occlusion in scientific terms) creates back pressure in your throat which has been shown to help the vocal folds vibrate more easily and reduce how hard some of the other muscles are squeezing.   HOW:   With Water - Fill the cup (or bottle) about 2 inches full of water. Blow bubbles through the straw while keeping your voice on. (kind of like making an \"ooooo\" sound through straw).Keep the water bubbling the whole time. That means your air is moving consistently.   Without Water - make sound through the straw (like it's a kazoo). Make sure you are using your air freely. You can hold your hand in front of the straw to test, and you should be able to feel the air moving.   Feel how open and relaxed your throat feels when you practice these sounds. If the bubbling or air stops or it gets tight, don't sweat it. Just breath, relax, and start again. Across all the exercises make sure you are getting a nice low breath and feeling the steady inward motion of low abdominal muscles when making sound.   Practice 5 times a day for no more than 3 minutes, and whenever you feel fatigued.   Aren't sure if you are doing it right? Ask yourself these three questions:   1. Does it feel easy?   2. Are the bubbles and voice consistent?   3. Do you feel that forward buzz?     What sounds to make:   Single pitches - use any comfortable pitch and sustain the note for as long as it feels free and easy, and your lips or tongue are bubbling.   Sighs - glide from high to low like you are sitting down in a comfortable chair after a long day of work   [place] - Start on a medium pitch and glide up just a bit and back down   Singing- glide slowly from one pitch to the next as if you are in slow motion or Dory speaking whale. Breathe whenever you need to.      "

## 2023-12-08 NOTE — PATIENT INSTRUCTIONS
1.  You were seen in the ENT Clinic today by . If you have any questions or concerns after your appointment, please call [phone]. Press option #1 for scheduling related needs. Press option #3 for Nurse advice.    2.   has recommended  the following:   - voice therapy   - schedule awake injection    3.  Plan is to return to clinic for post procedure follow up      Donna Cardenas LPN  [phone]  [facility] - Otolaryngology

## 2023-12-08 NOTE — PROGRESS NOTES
SPEECH LANGUAGE PATHOLOGY EVALUATION    See electronic medical record for Abuse and Falls Screening details.    Subjective      Presenting condition or subjective complaint:  Pt seen in conjunction with ENT Multidisciplinary clinic per Dr. Mahtews's request. Pt reports difficulty with swallowing.     Date of onset: 12/08/23    Relevant medical history:   spastic cerebral palsy, laryngeal reconstruction at age 5    Prior diagnostic imaging/testing results:     no reported swallowing evaluations   Prior therapy history for the same diagnosis, illness or injury:     Pt denies recent SLP services for voice or swallowing function.    Living Environment  Social support:   SO present at exam  Employeed: yes,   Patient goals for therapy:  none stated    Pain assessment: Pain denied     Objective     SWALLOW EVALUTION  Dysphagia history: Pt reports feeling solids get stuck like meats, noodles, eggs and cereal. She had it stop her breathing x1 on a piece of stringy meat. She otherwise feels things stuck high in her throat without changes to breathing. She will throat clear until she brings it up. She has only had 1 episode of actual choking. She denies recent changes in po consistency. She denies coughing on po intake.   Current Diet/Method of Nutritional Intake: thin liquids (level 0), regular diet        CLINICAL SWALLOW EVALUATION WITH ENDOSCOPIC VIEW PROVIDED BY ENT  Oral Motor Function: Dentition: natural dentition  Secretion management: WFL  Mucosal quality: good  Mandibular function: intact  Oral labial function: WFL  Lingual function: WFL  Velar function: intact   Buccal function: intact  Laryngeal function: throat clear, volitional swallow, glottic gap noted during phonation      Level of assist required for feeding: no assistance needed   Textures Trialed:   Clinical Swallow Eval: Thin Liquids  Mode of presentation: straw, self-fed   Volume presented: 8 oz water tinged blue  Preparatory Phase: WFL  Oral Phase:  WFL  Pharyngeal phase of swallow: intact   Strategies trialed during procedure: none   Diagnostic statement: No aspiration/penetration noted on thin liquid trials.     Clinical Swallow Eval: Purees  Mode of presentation: spoon, self-fed   Volume presented: 2 oz blue tinged applesauce  Preparatory Phase: WFL  Oral Phase: WFL  Pharyngeal phase of swallow: intact   Strategies trialed during procedure: alternating bites/sips   Diagnostic statement: No aspiration/penetration noted on puree consistency trials.     Clinical Swallow Eval: Solids  Mode of presentation: self-fed   Volume presented: One Florinda Doone cookie and 1 hard boiled egg  Preparatory Phase: WFL  Oral Phase: WFL  Pharyngeal phase of swallow: impaired   Strategies trialed during procedure: CARRIE SLP CLINICAL EVAL STRATEGIES: sip to clear residual    Diagnostic statement: No aspiration/penetration noted on solid consistency trials. Mild residue noted on egg trials in the valleculae which was cleared with sip of liquid.        ESOPHAGEAL PHASE OF SWALLOW  no observed or reported concerns related to esophageal function     SWALLOW ASSESSMENT CLINICAL IMPRESSIONS AND RATIONALE  Diet Consistency Recommendations: thin liquids (level 0), regular diet    Recommended Feeding/Eating Techniques: small bolus size, slow rate of intake, alternate food and liquid intake, maintain upright posture during/after eating for 30 minutes   Medication Administration Recommendations: Whole with liquid or puree  Instrumental Assessment Recommendations: none     Assessment & Plan   CLINICAL IMPRESSIONS   Medical Diagnosis: glottic gap, dysphonia    Treatment Diagnosis: mild oropharyngeal dysphagia likely due to laryngeal hyperfunction   Impression/Assessment: Pt is a 33 year old female with dysphagia complaints. The following significant findings have been identified: impaired swallowing, characterized by mild residue in the valleculae on solid consistency trials. She is able to clear  residue with sip of liquid. Residue does not occur on all consistencies presented. No aspiration/penetration noted on any consistencies presented. Adequate ROM and strength of oral mechanism demonstrated. She demonstrates laryngeal hyperfunction for voicing due to glottic gap which may contribute to her dysphagia symptoms. Consider re-evaluation of swallow function at ENT follow up visit.    PLAN OF CARE  Evaluation only    Recommended Referrals to Other Professionals: Speech Language Pathology  Education Assessment:   Learner/Method: Patient;No Barriers to Learning;Significant Other    Risks and benefits of evaluation/treatment have been explained.   Patient/Family/caregiver agrees with Plan of Care.     Evaluation Time:    SLP Eval: oral/pharyngeal swallow function, clinical minutes (76416): 15     Present: Not applicable     Signing Clinician: Margoth Mcfarlane, SLP

## 2023-12-08 NOTE — NURSING NOTE
Pro-op teaching done. Patient was agreeable and verbalized understanding of the situation. Taylor Zamudio RN on 12/8/2023 at 2:50 PM

## 2023-12-08 NOTE — LETTER
2023       RE: Monserrat Moralez  [address] [facility] [address]     Dear Colleague,    Thank you for referring your patient, Monserrat Moralez, to the [facility] EAR NOSE AND THROAT CLINIC [place] at [facility]. Please see a copy of my visit note below.        [facility]   at the [facility]   Otolaryngology Clinic     Patient: Monserrat Moralez    MRN: [id number]    : 1990    Age/Gender: 33 year old female  Date of Service: 2023  Rendering Provider:   June Mathews MD     Referring Provider   PCP: No Ref-Primary, Physician  Referring Physician: Rob Marks MD  [address]  [place] [address]  Reason for Consultation   Dysphonia  History   HISTORY OF PRESENT ILLNESS: Ms. Moralez is a 33 year old female who presents to us today with dysphonia.      Of note she had a thyroplasty at 17 and a reconstruction surgery at age 5.    she presents today with her partner for evaluation. she reports:    Dysphonia    - reconstruction surgery at age 5 in [place]  - thyroplasty at age 17- helped her voice at that time  - currently feels like a lot of effort with the voice  - if she loses her voice, it's gone for the rest of the day  - works at [employer], which is a strain on her voice  - swallowing gets hung up on solid foods  - meats, eggs, cereal, noodles towards the upper part of her throat  - she works on clearing her throat to help better  - breathing gets impacted, when she's walking  - her breathing doesn't get very noisy- some level of wheezing  - denies COPD, but has had asthma in the past- doesn't use inhalers  - her partner says he had trouble in the beginning understanding her with her voice        PAST MEDICAL HISTORY: No past medical history on file.    PAST SURGICAL HISTORY: No past surgical history on file.    CURRENT MEDICATIONS: No current outpatient medications on  file.    ALLERGIES: Sulfa antibiotics    SOCIAL HISTORY:    Social History     Socioeconomic History     Marital status:      Spouse name: Not on file     Number of children: Not on file     Years of education: Not on file     Highest education level: Not on file   Occupational History     Not on file   Tobacco Use     Smoking status: Not on file     Smokeless tobacco: Not on file   Substance and Sexual Activity     Alcohol use: Not on file     Drug use: Not on file     Sexual activity: Not on file   Other Topics Concern     Not on file   Social History Narrative     Not on file     Social Determinants of Health     Financial Resource Strain: Not on file   Food Insecurity: Not on file   Transportation Needs: Not on file   Physical Activity: Not on file   Stress: Not on file   Social Connections: Not on file   Interpersonal Safety: Not on file   Housing Stability: Not on file         FAMILY HISTORY: No family history on file.  Non-contributory for problems with anesthesia    REVIEW OF SYSTEMS:   The patient was asked a 14 point review of systems regarding constitutional symptoms, eye symptoms, ears, nose, mouth, throat symptoms, cardiovascular symptoms, respiratory symptoms, gastrointestinal symptoms, genitourinary symptoms, musculoskeletal symptoms, integumentary symptoms, neurological symptoms, psychiatric symptoms, endocrine symptoms, hematologic/lymphatic symptoms, and allergic/ immunologic symptoms.   The pertinent factors have been included in the HPI and below.  Patient Supplied Answers to Review of Systems       No data to display                Physical Examination   The patient underwent a physical examination as described below. The pertinent positive and negative findings are summarized after the description of the examination.  Constitutional: The patient's developmental and nutritional status was assessed. The patient's voice quality was assessed.  Head and Face: The head and face were inspected  for deformities. The sinuses were palpated. The salivary glands were palpated. Facial muscle strength was assessed bilaterally.  Eyes: Extraocular movements and primary gaze alignment were assessed.  Ears, Nose, Mouth and Throat: The ears and nose were examined for deformities. The nasal septum, mucosa, and turbinates were inspected by anterior rhinoscopy. The lips, teeth, and gums were examined for abnormalities. The oral mucosa, tongue, palate, tonsils, lateral and posterior pharynx were inspected for the presence of asymmetry or mucosal lesions.    Neck: The tracheal position was noted, and the neck mass palpated to determine if there were any asymmetries, abnormal neck masses, thyromegally, or thyroid nodules.  Respiratory: The nature of the breathing and chest expansion/symmetry was observed.  Cardiovascular: The patient was examined to determine the presence of any edema or jugular venous distension.  Abdomen: The contour of the abdomen was noted.  Lymphatic: The patient was examined for infraclavicular lymphadenopathy.  Musculoskeletal: The patient was inspected for the presence of skeletal deformities.  Extremities: The extremities were examined for any clubbing or cyanosis.  Skin: The skin was examined for inflammatory or neoplastic conditions.  Neurologic: The patient's orientation, mood, and affect were noted. The cranial nerve  functions were examined.  Other pertinent positive and negative findings on physical examination:      OC/OP: no lesions, uvula midline, soft palate elevates symmetrically   Neck: no lesions, bilateral TH tenderness to palpation, bilateral neck incision with hypotrophic scarring  All other physical examination findings were within normal limits and noncontributory.  Procedures     Flexible laryngoscopy (CPT 06852)      Pre-procedure diagnosis: dysphonia  Post-procedure diagnosis: same as above  Indication for procedure: Ms. Moralez is a 33 year old female with see  above  Procedure(s): Fiberoptic Laryngoscopy    Details of Procedure: After informed consent was obtained, the patient was seated in the examination chair.  The areas of the nasopharynx as well as the hypopharynx were anesthetized with topical 4% lidocaine with 0.25% phenylephrine atomizer.  Examination of the base of tongue was performed first.  Attention was directed to any evidence of masses in the area or evidence of leukoplakia or candidal infection.  Attention was directed to the epiglottis where its size and position was determined and its movement on phonation of the vowel  e .  The piriform sinuses were then inspected for any mass lesions or pooling of secretions.  Attention was then directed to the larynx. The vocal folds were inspected for infection or any areas of leukoplakia, for masses, polypoid degeneration, or hemorrhage.  Having done this, the arytenoids and vocal processes were inspected for erythema or evidence of granuloma formation.  The posterior commissure was then inspected for evidence of inflammatory changes in the mucosa and heaping up of mucosal tissue. The patient was then instructed to say the vowel  e .  Adduction of vocal folds to the midline was observed for any evidence of paresis or paralysis of the larynx or asymmetry in rotation of the larynx to the left or right. The patient was asked to breathe and the degree of abduction was noted bilaterally.  Subglottic view of the larynx was obtained for any additional mass lesions or mucosal changes.  Finally the post cricoid was examined for evidence of pooling of secretions, as well as the pharyngeal wall mucosa.   Anesthesia type: 0.25% phenylephrine    Findings:  Anatomic/physiological deviations: LNC, anterior commissure is not sharp, right vocal fold insertion is posterior and lower than the left vocal fold, very large glottic gap, uses ventricular phonation, pediatric scope   Right vocal process: No restriction of mobility   Left  vocal process: No restriction of mobility  Glottal gap: Glottal gap  Supraglottic structures: Normal  Hypopharynx: Normal     Estimated Blood Loss: minimal  Complications: None  Disposition: Patient tolerated the procedure well                 Fiberoptic Endoscopic Evaluation of Swallowing (CPT 05370)  and Interpretation of Swallowing (CPT 60870)    Indications: See above notes for complete history and physical. Patient complains of dysphagia to both solids and liquids and/or there is suggestion on history and endoscopic exam of the presence of dysphagia causing medical complaints.  Swallowing evaluation is being performed to assess the presence and degree of dysphagia, and to recommend a safe diet.     Pulmonary Status:  No PNA   Current Diet:              regular                                        thin liquids      Consistency Amounts:  Thin Liquid: sip   Puree: sip  Solid: cookies            Positioning: upright in a chair  Oral Peripheral Exam: See physical exam section.  Anatomic Notes: See Videostroboscopy report for assessment of anatomy and laryngeal functioning  Pharyngeal secretions prior to administration of liquid or food: No   Oral Phase Abnormal Findings: No abnormal behavior observed   Pharyngeal Phase Abnormal Findings:  mild vallecular residue with hard boiled egg, cleared with liquid wash     Recommended Diet:  regular                                        thin liquids            Review of Relevant Clinical Data   I personally reviewed:  Notes: Dr. Rob Marks 6/29/23, ENT-Otol  Assessment/Plan: 32 y.o. female with complicated history of laryngeal reconstruction as a child/teenager, and long-standing symptoms of dysphonia and dyspnea on exertion. Her laryngeal exam today shows fully mobility of her vocal cords without any evidence of laryngeal/subglottic stenosis or arytenoid fixation. Based on her degree of dysphonia, suspect there is likely height mismatch of her vocal processes  "though I am not able confirm that on our portable laryngoscopy today.      Her main goal is to improve dysphonia, and secondary goal is to improve her TYSON. We discussed referral to SLP to assist with her voice outcome. Given complexity will refer to tertiary center (discussed [facility] vs [facility], recommend [facility]). Will also recommend evaluation with Laryngology. Do not think there is an obvious surgical intervention, but given complexity and prior surgical history it would be worthwhile to hear Laryngology's input. She can f/u with me PRN.    Labs:  No results found for: \"TSH\"  Lab Results   Component Value Date     01/22/2022    CO2 25 01/22/2022    BUN 19 01/22/2022     Lab Results   Component Value Date    WBC 7.5 01/22/2022    HGB 15.6 01/22/2022    HCT 48.2 (H) 01/22/2022    MCV 92 01/22/2022     01/22/2022     No results found for: \"PT\", \"PTT\", \"INR\"  No results found for: \"LEDY\"  No components found for: \"RHEUMATOIDFACTOR\", \"RF\"  No results found for: \"CRP\"  No components found for: \"CKTOT\", \"URICACID\"  No components found for: \"C3\", \"C4\", \"DSDNAAB\", \"NDNAABIFA\"  No results found for: \"MPOAB\"    Patient reported Quality of Life (QOL) Measures   Patient Supplied Answers To VHI Questionnaire       No data to display                  Patient Supplied Answers To EAT Questionnaire       No data to display                  Patient Supplied Answers To CSI Questionnaire       No data to display                  Patient Supplied Answers to Dyspnea Index Questionnaire:       No data to display                Impression & Plan     IMPRESSION: Ms. Moralez is a 33 year old female who is being seen for the following:    Dysphonia  - in the setting of 5 year old reconstruction and 17 year old medialization   - loses voice after 1.5 hours  -  worse with exertion of her voice   - speaking voice is affected   - singing voice is affected   -  pain with voice use  - voice demand is high, as she works at [employer]  - scope " evaluation shows anterior commissure is not sharp, right vocal fold insertion is posterior and lower than the left vocal fold, very large glottic gap, uses ventricular phonation   - symptoms due to glottic gap from prior reconstructive surgery  - dicussed we can trial bilateral vocal fold injections with voice gel to see if this can improve her voice  - discussed limited benefits vs no benefits, discussed optimizing her breathing as well given vocal fold dysfunction, if the injection is helpful, then proceed with a fat injection as a long term solution  - do not see evidence of prior implant today, will need complete imaging, and obtain records  Plan  - neck CT scan w/o contrast  - voice therapy, need one session before injection  - obtain records of prior thyroplasty  - schedule trial vocal fold injections      2. Dysphagia  - in the setting of 5 year old reconstruction and 17 year old medialization  - solids, such as meats, eggs, cereal, and noodles, get hung up in the upper part of her throat  - throat clearing and drinking water normally helps  - frequency: all of the time   - pneumonias denies   - weight changes denies   - reflux denies  - GI work up denies  - imaging work up denies   - scope shows anterior commissure is not sharp, right vocal fold insertion is posterior and lower than the left vocal fold, very large glottic gap, uses ventricular phonation  - FEES shows mild vallecular residue with hard boiled egg, cleared with liquid wash    - symptoms due to muscle tension dysphagia    Plan   - safe swallow precaution    RETURN VISIT: after trial injection     Thank you for the kind referral and for allowing me to share in the care of Ms. Moralez. If you have any questions, please do not hesitate to contact me.    Scribe Disclosure:   I, Eryn Howe, am serving as a scribe; to document services personally performed by June Mathews MD -based on data collection and the provider's statements to me.      Provider Disclosure:  I agree with above History, Review of Systems, Physical exam and Plan.  I have reviewed the content of the documentation and have edited it as needed. I have personally performed the services documented here and the documentation accurately represents those services and the decisions I have made.        June Mathews MD    Laryngology    [facility]  Department of  Otolaryngology - Head and Neck Surgery  [facility] [place] [address]  Appointment line: [phone]  Fax: [phone]  [email or web address]        Again, thank you for allowing me to participate in the care of your patient.      Sincerely,    June Mathews MD

## 2023-12-08 NOTE — PROGRESS NOTES
Monserrat Moralez is a 33 year old female who is being evaluated via a billable video visit.      Monserrat has been notified and verbally consented to the following:   This video visit will be conducted between you and your provider.  Patient has opted to conduct today's video visit vs an in-person appointment.   Video visits are billed at different rates depending on your insurance coverage. Please reach out to your insurance provider with any questions.   If during the course of the call the provider feels the appointment is not appropriate, you will not be charged for this service.  Provider has received verbal consent for billable virtual visit from the patient? Yes  Will anyone else be joining your video visit? Patient's significant other    Call initiated at: 01:00 pm   Type of Visit Platform Used: [email or web address] Video  Location of provider: [facility]  Location of patient: [facility]  Harsh Mcfarland Jr., M.D., F.A.C.S.  Linda Sheldon M.D., M.P.H.  June Mathews M.D.  June Hale M.M. (voice), M.A., CCC-SLP  Tika Freeman M.A., CCC-SLP  Beata Jones, Ph.D., CCC-SLP  Phillip Fitch, Ph.D., CCC-SLP  YAJAIRA Mcgee.S., CCC-SLP  Terrance Stafford M.M., M.A., CCC-SLP  ROBYN Hummel (voice), M.S., [facility]  VOICE/SPEECH/BREATHING THERAPY PROGRESS REPORT    Patient: Monserrat Moralez  Date of Service: 12/8/2023    Date of Last Service: 12/08/2023  Referring physician: Dr. Mathews  Please see Trerance Stafford M.A., CCC-SLP's note for full impressions from initial evaluation today (12/08/2023).     I had the pleasure of seeing Ms. 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 Moralez worked on therapeutic activities to address the  "above diagnosis.    Ms. Moralez was seen for evaluation on 12/08/2023.  At that time, it was determined that  she would benefit from a course of speech therapy to address the above diagnosis.      Ms. Moralez presents today with the following:  Voice quality:  Moderate-severe roughness  Mild-moderate strain  Mild-moderate breathiness    THERAPEUTIC ACTIVITIES  Today Ms. Moralez participated in the following therapeutic activities:  Asked many questions about the nature of her symptoms, and I answered all of these thoroughly.  [place] techniques for optimal breathing in all situations  I provided instructions for rescue breathing strategies  I provided techniques for respiratory timing with exercise.  [place] exercises to add phonation to the optimal flowing airstream.  Semi-occluded vocal tract exercises with a straw (\"cup and bubbles\"), straw phonation, lip trills, Buzzy Z, trumpet, and humming (smile and yawn posture) instructions were provided.  The lip trills, Buzzy Z, humming, and trumpet were the most facilitating  Instructed to use as a voice warm up, cool down, coordination of breath flow with phonation, as frequent voice resets throughout the day  good learning, but will need practice   She reports feeling a buzz at her lips and some tension in her throat or a \"buzz in the throat.\" Additionally, the forward buzz tickles her nose.   [place] concepts of an optimal regimen for practice.  she should use an interval schedule of practice, with brief periods of practice frequently throughout each day  I provided an after visit summary to help facilitate practice.    IMPRESSIONS/GOALS/PLAN  Ms. 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     She will continue to work on her exercises on a daily basis, and work on incorporating the techniques into her daily activities.    Progress toward long-term goals:   Minimal at this point, as this is first session, but good learning today    Goals for this practice period:   practice all exercises according to instructions  incorporate techniques into daily vocal activities    Plan: Ms. Moralez will see Dr. Jones on January 5, 2024 to work on education, modification, and carryover of therapeutic activities to more complex activities.    TOTAL SERVICE TIME: 60 minutes  TREATMENT (83962)  NO CHARGE FACILITY FEE    Tika Freeman M.A., CCC-SLP  Speech-Language Pathologist  [facility]  [email or web address]  She/Her/Hers     Speech recognition software may have been used in this documentation; input is reviewed before signature to the best of my ability.

## 2023-12-08 NOTE — Clinical Note
Hi DD,  This young lady is coming to see you in a few weeks.  I know Tika saw her briefly on the day of her visit to teach her some rescue breathing strategies in advance of the injection augmentation, but to take a look at my note and make sure things seem clear.  I am happy to talk to more, but the goal is not to aim for clear or normal voice, something the patient is well aware of.  Primary focus she is hoping for is for improved endurance and may be improved loudness to some degree.  Things will be pretty clear if you see the exam too. Happy to talk anytime  -Terrance

## 2023-12-08 NOTE — TELEPHONE ENCOUNTER
Records Requested     December 8, 2023 4:05 PM   [facility]   [phone]  Fax: [phone]   [address] [place] [address]   Outcome Sent a request for thyroplasty OP report     12/11/23 8AM- received a fax from [facility] that they do not have records of Pt in their system.     December 12, 2023 8:43 AM Called Monserrat to ask what what her maiden name before marriage, but I could't really understand her as her voice was very raspy and hard to hear. - Yolande     December 12, 2023 9:31 AM- Received a call back from Monserrat giving me her maiden name Lopez. And that she had another surgery at [facility]  - [facility] Fax [phone]  PH: [phone]    December 12, 2023 9:53 AM- Monserrat called me back that she had another name change before her adoption. I told her if its easier she can reply back to me on the email that I sent her and once I received everything I can start requesting all the records - Yolande     December 14, 2023 12:28 PM Called [place] to follow up on my second request and they told me that she is not a pt of theirs even with that correct maiden name. Vi told me to try [facility] - Yolande      Records Requested     December 14, 2023 12:36 PM   [facility]   Fax [phone]  PH: [phone]   Outcome FAXED A REQUEST FOR RECORDS     December 14, 2023 3:33 PM- Received a fax from them stating that they do not have her in their system  Yolande      December 19, 2023 9:53 AM try to look up Dr. Steve Wheeler MD on [employer] and called to see if they have the surgery records for monserrat but the phone just kept ringing and it is not going through. Was able to get through one number but they said its the wrong office and they don't have that providers too - Yolande

## 2023-12-11 ENCOUNTER — PREP FOR PROCEDURE (OUTPATIENT)
Dept: OTOLARYNGOLOGY | Facility: CLINIC | Age: 33
End: 2023-12-11
Payer: COMMERCIAL

## 2023-12-11 ENCOUNTER — HOSPITAL ENCOUNTER (OUTPATIENT)
Facility: AMBULATORY SURGERY CENTER | Age: 33
Discharge: HOME | End: 2023-12-11
Attending: OTOLARYNGOLOGY | Admitting: OTOLARYNGOLOGY
Payer: COMMERCIAL

## 2023-12-11 VITALS
HEIGHT: 63 IN | TEMPERATURE: 98.5 F | RESPIRATION RATE: 16 BRPM | WEIGHT: 179 LBS | HEART RATE: 76 BPM | DIASTOLIC BLOOD PRESSURE: 63 MMHG | BODY MASS INDEX: 31.71 KG/M2 | SYSTOLIC BLOOD PRESSURE: 100 MMHG | OXYGEN SATURATION: 98 %

## 2023-12-11 DIAGNOSIS — J38.3 GLOTTIC INSUFFICIENCY: Primary | ICD-10-CM

## 2023-12-11 PROCEDURE — 31574 LARGSC W/NJX AUGMENTATION: CPT | Mod: 52 | Performed by: OTOLARYNGOLOGY

## 2023-12-11 RX ORDER — LIDOCAINE HYDROCHLORIDE AND EPINEPHRINE 10; 10 MG/ML; UG/ML
INJECTION, SOLUTION INFILTRATION; PERINEURAL PRN
Status: DISCONTINUED | OUTPATIENT
Start: 2023-12-11 | End: 2023-12-11 | Stop reason: HOSPADM

## 2023-12-11 RX ORDER — LIDOCAINE HYDROCHLORIDE 40 MG/ML
SOLUTION TOPICAL PRN
Status: DISCONTINUED | OUTPATIENT
Start: 2023-12-11 | End: 2023-12-11 | Stop reason: HOSPADM

## 2023-12-11 RX ORDER — OXYMETAZOLINE HYDROCHLORIDE 0.05 G/100ML
SPRAY NASAL PRN
Status: DISCONTINUED | OUTPATIENT
Start: 2023-12-11 | End: 2023-12-11 | Stop reason: HOSPADM

## 2023-12-11 RX ORDER — LIDOCAINE HYDROCHLORIDE 20 MG/ML
JELLY TOPICAL PRN
Status: DISCONTINUED | OUTPATIENT
Start: 2023-12-11 | End: 2023-12-11 | Stop reason: HOSPADM

## 2023-12-11 NOTE — OP NOTE
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 The risks, benefits and alternatives of the surgery were discussed. The patient wishes to proceed with surgery and has signed an informed consent.     FINDINGS:   Anatomic/physiological deviations: RNC and LNC both very tight, VH scope did pass, VT2 scope did not pass, tried via transthyrohyoid could not advance the needle through the petiole due to ossification, tried transoral could not tolerate due to gagging, tried transnasal could not pass due to the scope - procedure aborted, transtracheal anesthesia done    Right vocal process: No restriction of mobility   Left vocal process: No restriction of mobility  Glottal gap: Glottal gap  Supraglottic structures: Normal  Hypopharynx: Normal     Post-op plan: needs MDL with injection laryngoplasty under anesthesia    DESCRIPTION OF PROCEDURE:   Then, the patient was seated upright. The areas of the nasopharynx as well as the hypopharynx were anesthetized with topical 4% lidocaine with 0.25% phenylephrine atomizer. The scope was then passed in the right or left nasal cavity, depending upon which side had the greater opening, and passed in through middle or the inferior meatus. Upon reaching the nasopharynx, the patient was instructed to breathe through the nasal cavity and the scope was passed inferiorly into the hypopharynx until the epiglottis was visualized. The scope was then passed beyond the  epiglottis and both the brightness and focus were readjusted for maximum resolution. The vocal cords were visualized and the larynx was then carefully identified. An assessment of glottic closure was made. The chin of the patient was moved up and down to identify external landmarks including the cricoid, cricothyroid membrane, superior notch of the thyroid cartilage, and the hyoid bone. These landmarks were palpated and, based upon this, a decision was made as to the position of the vocal cords in relationship to external landmarks. A 27-gauge needle was then secured to the augmentation syringe and air was removed from the needle. The needle was passed transcartilagenous or transmembranous into the vocal fold. The needle tips were then observed on the video monitor, care being taken not to perforate the membranous vocal cord. After correct needle placement was confirmed on the video monitor, augmentation agent was injected until the vocal cords were able to achieve optimal closure during adduction. During injection the patient s airway was carefully observed to maintain at least a 4 mm airway during injection. The injection was performed in 0.1 ml aliquots, the patient was asked to cough and to phonate  e  after each aliquot to dynamically assess the anatomic and acoustic effects of the injection. The vocal fold was over-injected by approximately 0.1 mg to account for resorption of the water content. Following maximal augmentation, the scope was withdrawn atraumatically.       COMPLICATIONS:   None.  .   ESTIMATED BLOOD LOSS:   Less than 10cc    DISPOSITION:   PACU.    SPECIMENS:  * No specimens in log *       PHOTODOCUMENTATION:

## 2023-12-11 NOTE — DISCHARGE INSTRUCTIONS
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 Hematology team following. Patient received a dose of PO cytotec ~30 mins prior to assessment. Patient assessed for an episode of vaginal bleeding. CB removed.     -FHT cat I currently. Will keep the CB out.   -Will re-evaluate bleeding at the bedside in 30 mins or sooner prn    Dr. Canales made aware  Will make Hematology aware as per OB attending     Made Dr. Walker aware. Per Dr. Walker, will not continue PO cytotec; if contractions space out and FHT is tolerant, will start pitocin (2 hours after the last PO cytotec dose)    Birdie Potts PGY-4     Ok to resume preprocedure activities and diet   She will receive a phone call to schedule her next procedure

## 2023-12-28 ENCOUNTER — TELEPHONE (OUTPATIENT)
Dept: OTOLARYNGOLOGY | Facility: CLINIC | Age: 33
End: 2023-12-28
Payer: COMMERCIAL

## 2023-12-28 NOTE — TELEPHONE ENCOUNTER
Called patient to confirm their plan for pre op physical as one has yet to be completed prior to surgery with Dr. Mathews on 1/03/24.   Patient said at this time she has not scheduled or been seen for a pre op as she is waiting on her records from all previous surgeries/appointments. Asked that patient give our office a call back early next week if she has yet to schedule pre op so we may reschedule her surgery if needed. Patient understood and thanked writer.     Francia Rodriguez on 12/28/2023 at 10:40 AM

## 2023-12-29 ENCOUNTER — PATIENT OUTREACH (OUTPATIENT)
Dept: OTOLARYNGOLOGY | Facility: CLINIC | Age: 33
End: 2023-12-29
Payer: COMMERCIAL

## 2023-12-29 NOTE — PROGRESS NOTES
Called patient as she had a question about a missed call. Let patient know that she needed to schedule a pre-op physical prior to procedure. Patient was agreeable to this and will reach out if she is not able to get one prior to the surgery. Taylor Zamudio RN on 12/29/2023 at 12:59 PM

## 2023-12-29 NOTE — TELEPHONE ENCOUNTER
Received message from PAC that patients PCP was only able to send pre op notes from 11/06/23 as patient has not been seen for a pre op since.   Called patient to confirm their plan regarding pre op. No answer, callback number [phone] left on vm for patient.     Francia Rodriguez on 12/29/2023 at 11:47 AM

## 2024-01-02 ENCOUNTER — ANESTHESIA EVENT (OUTPATIENT)
Dept: SURGERY | Facility: CLINIC | Age: 34
End: 2024-01-02
Payer: COMMERCIAL

## 2024-01-02 ASSESSMENT — ENCOUNTER SYMPTOMS: SEIZURES: 0

## 2024-01-02 NOTE — ANESTHESIA PREPROCEDURE EVALUATION
Anesthesia Pre-Procedure Evaluation    Patient: Monserrat Moralez   MRN: [id number] : 1990        Procedure : Procedure(s):  MICROLARYNGOSCOPY with vocal fold injection with voice gel          History reviewed. No pertinent past medical history.   Past Surgical History:   Procedure Laterality Date    LARYNGOSCOPY, FLEXIBLE WITH INJECTION N/A 2023    Procedure: LARYNGOSCOPY, FLEXIBLE WITH INJECTION;  Surgeon: June Mathews MD;  Location: [facility] OR      Allergies   Allergen Reactions    Soap      Tide Detergent    Sulfa Antibiotics       Social History     Tobacco Use    Smoking status: Never    Smokeless tobacco: Never   Substance Use Topics    Alcohol use: Never      Wt Readings from Last 1 Encounters:   23 81.2 kg (179 lb)        Anesthesia Evaluation   Pt has had prior anesthetic. Type: General.    No history of anesthetic complications       ROS/MED HX  ENT/Pulmonary: Comment: Glottic insufficiency   (-) sleep apnea   Neurologic:  - neg neurologic ROS  (-) no seizures   Cardiovascular:  - neg cardiovascular ROS  (-) hypertension, murmur and wheezes   METS/Exercise Tolerance:     Hematologic:  - neg hematologic  ROS     Musculoskeletal:  - neg musculoskeletal ROS     GI/Hepatic:  - neg GI/hepatic ROS  (-) GERD   Renal/Genitourinary:  - neg Renal ROS     Endo:     (+)               Obesity,    (-) Type II DM   Psychiatric/Substance Use:       Infectious Disease:  - neg infectious disease ROS     Malignancy:       Other: Comment: Denies chance of pregnancy     (+)  LMP: 23, ,         Physical Exam    Airway        Mallampati: II   TM distance: > 3 FB   Neck ROM: full   Mouth opening: > 3 cm    Respiratory Devices and Support         Dental       (+) Minor Abnormalities - some fillings, tiny chips    B=Bridge, C=Chipped, L=Loose, M=Missing    Cardiovascular          Rhythm and rate: regular and normal (-) no murmur    Pulmonary           (+) stridor   (-) no rhonchi and no  "wheezes        OUTSIDE LABS:  CBC:   Lab Results   Component Value Date    WBC 7.5 01/22/2022    HGB 15.6 01/22/2022    HCT 48.2 (H) 01/22/2022     01/22/2022     BMP:   Lab Results   Component Value Date     01/22/2022    POTASSIUM 3.8 01/22/2022    CHLORIDE 110 (H) 01/22/2022    CO2 25 01/22/2022    BUN 19 01/22/2022    CR 0.64 01/22/2022    GLC 86 01/22/2022     COAGS: No results found for: \"PTT\", \"INR\", \"FIBR\"  POC: No results found for: \"BGM\", \"HCG\", \"HCGS\"  HEPATIC: No results found for: \"ALBUMIN\", \"PROTTOTAL\", \"ALT\", \"AST\", \"GGT\", \"ALKPHOS\", \"BILITOTAL\", \"BILIDIRECT\", \"NO\"  OTHER:   Lab Results   Component Value Date    ZANE 9.5 01/22/2022       Anesthesia Plan    ASA Status:  2    NPO Status:  NPO Appropriate    Anesthesia Type: General.     - Airway: ETT   Induction: Intravenous.   Maintenance: TIVA.   Techniques and Equipment:     - Lines/Monitors: BIS     Consents    Anesthesia Plan(s) and associated risks, benefits, and realistic alternatives discussed. Questions answered and patient/representative(s) expressed understanding.     - Discussed: Risks, Benefits and Alternatives for BOTH SEDATION and the PROCEDURE were discussed     - Discussed with:  Patient      - Extended Intubation/Ventilatory Support Discussed: No.      - Patient is DNR/DNI Status: No     Use of blood products discussed: No .     Postoperative Care    Pain management: IV analgesics, Oral pain medications, Multi-modal analgesia.   PONV prophylaxis: Dexamethasone or Solumedrol, Ondansetron (or other 5HT-3), Background Propofol Infusion     Comments:               Rashida Hinds MD    I have reviewed the pertinent notes and labs in the chart from the past 30 days and (re)examined the patient.  Any updates or changes from those notes are reflected in this note.              # Obesity: Estimated body mass index is 31.71 kg/m  as calculated from the following:    Height as of 12/11/23: 1.6 m (5' 3\").    Weight as of 12/11/23: " 81.2 kg (179 lb).

## 2024-01-03 ENCOUNTER — HOSPITAL ENCOUNTER (OUTPATIENT)
Facility: CLINIC | Age: 34
Discharge: HOME | End: 2024-01-03
Attending: OTOLARYNGOLOGY | Admitting: OTOLARYNGOLOGY
Payer: COMMERCIAL

## 2024-01-03 ENCOUNTER — ANESTHESIA (OUTPATIENT)
Dept: SURGERY | Facility: CLINIC | Age: 34
End: 2024-01-03
Payer: COMMERCIAL

## 2024-01-03 VITALS
OXYGEN SATURATION: 98 % | RESPIRATION RATE: 18 BRPM | TEMPERATURE: 98.4 F | SYSTOLIC BLOOD PRESSURE: 110 MMHG | HEIGHT: 63 IN | BODY MASS INDEX: 31.99 KG/M2 | HEART RATE: 73 BPM | WEIGHT: 180.56 LBS | DIASTOLIC BLOOD PRESSURE: 35 MMHG

## 2024-01-03 DIAGNOSIS — J38.3 GLOTTIC INSUFFICIENCY: Primary | ICD-10-CM

## 2024-01-03 PROCEDURE — C1878 MATRL FOR VOCAL CORD: HCPCS | Performed by: OTOLARYNGOLOGY

## 2024-01-03 PROCEDURE — 250N000025 HC SEVOFLURANE, PER MIN: Performed by: OTOLARYNGOLOGY

## 2024-01-03 PROCEDURE — 999N000141 HC STATISTIC PRE-PROCEDURE NURSING ASSESSMENT: Performed by: OTOLARYNGOLOGY

## 2024-01-03 PROCEDURE — 272N000001 HC OR GENERAL SUPPLY STERILE: Performed by: OTOLARYNGOLOGY

## 2024-01-03 PROCEDURE — 250N000013 HC RX MED GY IP 250 OP 250 PS 637: Performed by: STUDENT IN AN ORGANIZED HEALTH CARE EDUCATION/TRAINING PROGRAM

## 2024-01-03 PROCEDURE — 710N000010 HC RECOVERY PHASE 1, LEVEL 2, PER MIN: Performed by: OTOLARYNGOLOGY

## 2024-01-03 PROCEDURE — 710N000012 HC RECOVERY PHASE 2, PER MINUTE: Performed by: OTOLARYNGOLOGY

## 2024-01-03 PROCEDURE — 250N000011 HC RX IP 250 OP 636: Performed by: STUDENT IN AN ORGANIZED HEALTH CARE EDUCATION/TRAINING PROGRAM

## 2024-01-03 PROCEDURE — 258N000003 HC RX IP 258 OP 636: Performed by: STUDENT IN AN ORGANIZED HEALTH CARE EDUCATION/TRAINING PROGRAM

## 2024-01-03 PROCEDURE — 250N000009 HC RX 250: Performed by: STUDENT IN AN ORGANIZED HEALTH CARE EDUCATION/TRAINING PROGRAM

## 2024-01-03 PROCEDURE — 360N000076 HC SURGERY LEVEL 3, PER MIN: Performed by: OTOLARYNGOLOGY

## 2024-01-03 PROCEDURE — 370N000017 HC ANESTHESIA TECHNICAL FEE, PER MIN: Performed by: OTOLARYNGOLOGY

## 2024-01-03 DEVICE — IMP VOCAL CORD PROLARYN GEL INJ 1ML 8602MOK5: Type: IMPLANTABLE DEVICE | Site: THROAT | Status: FUNCTIONAL

## 2024-01-03 RX ORDER — HYDROCODONE BITARTRATE AND ACETAMINOPHEN 5; 325 MG/1; MG/1
1 TABLET ORAL
Status: COMPLETED | OUTPATIENT
Start: 2024-01-03 | End: 2024-01-03

## 2024-01-03 RX ORDER — ACETAMINOPHEN 325 MG/1
650 TABLET ORAL EVERY 4 HOURS PRN
Qty: 50 TABLET | Refills: 0 | Status: SHIPPED | OUTPATIENT
Start: 2024-01-03

## 2024-01-03 RX ORDER — SODIUM CHLORIDE, SODIUM LACTATE, POTASSIUM CHLORIDE, CALCIUM CHLORIDE 600; 310; 30; 20 MG/100ML; MG/100ML; MG/100ML; MG/100ML
INJECTION, SOLUTION INTRAVENOUS CONTINUOUS
Status: DISCONTINUED | OUTPATIENT
Start: 2024-01-03 | End: 2024-01-03 | Stop reason: HOSPADM

## 2024-01-03 RX ORDER — ACETAMINOPHEN 325 MG/1
650 TABLET ORAL
Status: DISCONTINUED | OUTPATIENT
Start: 2024-01-03 | End: 2024-01-03 | Stop reason: HOSPADM

## 2024-01-03 RX ORDER — ONDANSETRON 2 MG/ML
4 INJECTION INTRAMUSCULAR; INTRAVENOUS EVERY 30 MIN PRN
Status: DISCONTINUED | OUTPATIENT
Start: 2024-01-03 | End: 2024-01-03 | Stop reason: HOSPADM

## 2024-01-03 RX ORDER — ONDANSETRON 2 MG/ML
INJECTION INTRAMUSCULAR; INTRAVENOUS PRN
Status: DISCONTINUED | OUTPATIENT
Start: 2024-01-03 | End: 2024-01-03

## 2024-01-03 RX ORDER — OXYCODONE HYDROCHLORIDE 10 MG/1
10 TABLET ORAL
Status: CANCELLED | OUTPATIENT
Start: 2024-01-03

## 2024-01-03 RX ORDER — FENTANYL CITRATE 50 UG/ML
50 INJECTION, SOLUTION INTRAMUSCULAR; INTRAVENOUS EVERY 5 MIN PRN
Status: DISCONTINUED | OUTPATIENT
Start: 2024-01-03 | End: 2024-01-03 | Stop reason: HOSPADM

## 2024-01-03 RX ORDER — DEXAMETHASONE SODIUM PHOSPHATE 4 MG/ML
INJECTION, SOLUTION INTRA-ARTICULAR; INTRALESIONAL; INTRAMUSCULAR; INTRAVENOUS; SOFT TISSUE PRN
Status: DISCONTINUED | OUTPATIENT
Start: 2024-01-03 | End: 2024-01-03

## 2024-01-03 RX ORDER — OXYCODONE HYDROCHLORIDE 5 MG/1
5 TABLET ORAL
Status: CANCELLED | OUTPATIENT
Start: 2024-01-03

## 2024-01-03 RX ORDER — ONDANSETRON 4 MG/1
4 TABLET, ORALLY DISINTEGRATING ORAL EVERY 8 HOURS PRN
Qty: 4 TABLET | Refills: 0 | Status: SHIPPED | OUTPATIENT
Start: 2024-01-03

## 2024-01-03 RX ORDER — ONDANSETRON 2 MG/ML
4 INJECTION INTRAMUSCULAR; INTRAVENOUS EVERY 30 MIN PRN
Status: CANCELLED | OUTPATIENT
Start: 2024-01-03

## 2024-01-03 RX ORDER — FENTANYL CITRATE 50 UG/ML
25 INJECTION, SOLUTION INTRAMUSCULAR; INTRAVENOUS EVERY 5 MIN PRN
Status: DISCONTINUED | OUTPATIENT
Start: 2024-01-03 | End: 2024-01-03 | Stop reason: HOSPADM

## 2024-01-03 RX ORDER — LIDOCAINE 40 MG/G
CREAM TOPICAL
Status: DISCONTINUED | OUTPATIENT
Start: 2024-01-03 | End: 2024-01-03 | Stop reason: HOSPADM

## 2024-01-03 RX ORDER — LIDOCAINE HYDROCHLORIDE 20 MG/ML
INJECTION, SOLUTION INFILTRATION; PERINEURAL PRN
Status: DISCONTINUED | OUTPATIENT
Start: 2024-01-03 | End: 2024-01-03

## 2024-01-03 RX ORDER — PROPOFOL 10 MG/ML
INJECTION, EMULSION INTRAVENOUS CONTINUOUS PRN
Status: DISCONTINUED | OUTPATIENT
Start: 2024-01-03 | End: 2024-01-03

## 2024-01-03 RX ORDER — ONDANSETRON 4 MG/1
4 TABLET, ORALLY DISINTEGRATING ORAL EVERY 30 MIN PRN
Status: CANCELLED | OUTPATIENT
Start: 2024-01-03

## 2024-01-03 RX ORDER — ONDANSETRON 4 MG/1
4 TABLET, ORALLY DISINTEGRATING ORAL EVERY 30 MIN PRN
Status: DISCONTINUED | OUTPATIENT
Start: 2024-01-03 | End: 2024-01-03 | Stop reason: HOSPADM

## 2024-01-03 RX ORDER — FENTANYL CITRATE 50 UG/ML
INJECTION, SOLUTION INTRAMUSCULAR; INTRAVENOUS PRN
Status: DISCONTINUED | OUTPATIENT
Start: 2024-01-03 | End: 2024-01-03

## 2024-01-03 RX ADMIN — FENTANYL CITRATE 50 MCG: 50 INJECTION INTRAMUSCULAR; INTRAVENOUS at 08:16

## 2024-01-03 RX ADMIN — LIDOCAINE HYDROCHLORIDE 40 MG: 20 INJECTION, SOLUTION INFILTRATION; PERINEURAL at 08:06

## 2024-01-03 RX ADMIN — FENTANYL CITRATE 25 MCG: 50 INJECTION, SOLUTION INTRAMUSCULAR; INTRAVENOUS at 09:09

## 2024-01-03 RX ADMIN — HYDROCODONE BITARTRATE AND ACETAMINOPHEN 1 TABLET: 5; 325 TABLET ORAL at 09:24

## 2024-01-03 RX ADMIN — DEXAMETHASONE SODIUM PHOSPHATE 6 MG: 4 INJECTION, SOLUTION INTRA-ARTICULAR; INTRALESIONAL; INTRAMUSCULAR; INTRAVENOUS; SOFT TISSUE at 07:47

## 2024-01-03 RX ADMIN — PROPOFOL 40 MCG/KG/MIN: 10 INJECTION, EMULSION INTRAVENOUS at 08:16

## 2024-01-03 RX ADMIN — DEXAMETHASONE SODIUM PHOSPHATE 2 MG: 4 INJECTION, SOLUTION INTRA-ARTICULAR; INTRALESIONAL; INTRAMUSCULAR; INTRAVENOUS; SOFT TISSUE at 08:11

## 2024-01-03 RX ADMIN — FENTANYL CITRATE 25 MCG: 50 INJECTION INTRAMUSCULAR; INTRAVENOUS at 08:43

## 2024-01-03 RX ADMIN — FENTANYL CITRATE 25 MCG: 50 INJECTION, SOLUTION INTRAMUSCULAR; INTRAVENOUS at 09:02

## 2024-01-03 RX ADMIN — Medication 30 MG: at 08:20

## 2024-01-03 RX ADMIN — PROPOFOL 30 MG: 10 INJECTION, EMULSION INTRAVENOUS at 08:18

## 2024-01-03 RX ADMIN — ONDANSETRON 4 MG: 2 INJECTION INTRAMUSCULAR; INTRAVENOUS at 08:27

## 2024-01-03 RX ADMIN — SUCCINYLCHOLINE CHLORIDE 40 MG: 20 INJECTION, SOLUTION INTRAMUSCULAR; INTRAVENOUS; PARENTERAL at 08:11

## 2024-01-03 RX ADMIN — PROPOFOL 150 MCG/KG/MIN: 10 INJECTION, EMULSION INTRAVENOUS at 08:20

## 2024-01-03 RX ADMIN — SODIUM CHLORIDE, POTASSIUM CHLORIDE, SODIUM LACTATE AND CALCIUM CHLORIDE: 600; 310; 30; 20 INJECTION, SOLUTION INTRAVENOUS at 07:38

## 2024-01-03 ASSESSMENT — ENCOUNTER SYMPTOMS: STRIDOR: 1

## 2024-01-03 ASSESSMENT — ACTIVITIES OF DAILY LIVING (ADL)
ADLS_ACUITY_SCORE: 29

## 2024-01-03 NOTE — DISCHARGE INSTRUCTIONS
Resume normal diet  Ok to use normal voice.        Contacting your Doctor -   To contact a doctor, call Dr. Mathews's ENT clinic at [phone] or:  [phone] and ask for the resident on call for ENT-Otolaryngology (answered 24 hours a day)   Emergency Department:  [facility]: [phone]  [facility]: [phone] 911 if you are in need of immediate or emergent help

## 2024-01-03 NOTE — ANESTHESIA POSTPROCEDURE EVALUATION
Patient: Monserrat Moralez    Procedure: Procedure(s):  MICROLARYNGOSCOPY with vocal fold injection with voice gel       Anesthesia Type:  General    Note:  Disposition: Outpatient   Postop Pain Control: Uneventful            Sign Out: Well controlled pain   PONV: No   Neuro/Psych: Uneventful            Sign Out: Acceptable/Baseline neuro status   Airway/Respiratory: Uneventful            Sign Out: Acceptable/Baseline resp. status   CV/Hemodynamics: Uneventful            Sign Out: Acceptable CV status; No obvious hypovolemia; No obvious fluid overload   Other NRE: NONE   DID A NON-ROUTINE EVENT OCCUR? No           Last vitals:  Vitals Value Taken Time   /62 01/03/24 0930   Temp 36.6  C (97.9  F) 01/03/24 0900   Pulse 62 01/03/24 0940   Resp 14 01/03/24 0930   SpO2 96 % 01/03/24 0940   Vitals shown include unfiled device data.    Electronically Signed By: Nicolas Knight MD  January 3, 2024  9:41 AM

## 2024-01-03 NOTE — ANESTHESIA CARE TRANSFER NOTE
Patient: Monserrat Moralez    Procedure: Procedure(s):  MICROLARYNGOSCOPY with vocal fold injection with voice gel       Diagnosis: Glottic insufficiency [J38.3]  Diagnosis Additional Information: No value filed.    Anesthesia Type:   General     Note:    Oropharynx: oropharynx clear of all foreign objects and spontaneously breathing  Level of Consciousness: awake  Oxygen Supplementation: face mask  Level of Supplemental Oxygen (L/min / FiO2): 6  Independent Airway: airway patency satisfactory and stable  Dentition: dentition unchanged  Vital Signs Stable: post-procedure vital signs reviewed and stable  Report to RN Given: handoff report given  Patient transferred to: PACU    Handoff Report: Identifed the Patient, Identified the Reponsible Provider, Reviewed the pertinent medical history, Discussed the surgical course, Reviewed Intra-OP anesthesia mangement and issues during anesthesia, Set expectations for post-procedure period and Allowed opportunity for questions and acknowledgement of understanding    Vitals:  Vitals Value Taken Time   BP     Temp     Pulse 74 01/03/24 0855   Resp     SpO2 90 % 01/03/24 0855   Vitals shown include unfiled device data.    Electronically Signed By: Rashida Hinds MD  January 3, 2024  8:56 AM

## 2024-01-03 NOTE — OP NOTE
Operative Note   Otolaryngology - Head and Neck Surgery       DATE OF OPERATION:   January 3, 2024    PREOPERATIVE DIAGNOSIS:   Glottic insufficiency  Anterior glottic gap  History of airway reconstruction  Tracheal stenosis     POSTOPERATIVE DIAGNOSIS:   Same    NAME OF OPERATION:   Microdirect suspension laryngoscopy with injection laryngoplasty with voice gel to the right and left vocal fold    ANESTHESIA  Type: general   Jet ventilation    SURGEON:   June Mathews MD    RESIDENT SURGEON(S):   Samanta Lincoln MD    INDICATIONS FOR PROCEDURE:   The patient is a 33 year old female with history of airway reconstruction with resulting dypshonia. The patient comes in for injection laryngoplasty. Risks, benefits and alternatives were discussed. The patient wishes to proceed with surgery and has signed an informed consent.     FINDINGS:   1. Exposure grade 1 with ossoff laryngoscope  2. Right vocal fold with 0.3ccc of voice gel with extrusion anteriorly and laterally  3. Left vocal fod with 0.6cc of voice gel  4. Anterior commissure - u shaped and at different levels, right was lower  5. Upper trachea with evidence of reconstruction - smallest diameter most distal 9mm long x 5mm wide - able to be intubated with 6.0 ett    COMPLEXITY  This surgery was more complex than normally because of performing the surgery with history of airway reconsructoin. The approach therefore was more difficult technically than normally.  Significantly more time was required to perform all parts of the operation, especially management of her airway - she was masked down, then jet ventilated then masked up again.        DESCRIPTION OF PROCEDURE:   The patient was brought into the operating room and placed supine on the operating room table. A time-out was performed. General anesthesia was induced. The patient was 2 hand and oral airway mask ventilated.     Then the patient was turned 90 degrees from the anesthesiologist. The head was drapped in  the usual fashion. Then the dentition and mucosa were inspected prior to start. A reinforced tooth guard was placed on the upper dentition. The ossoff laryngoscope was carefully introduced into the oral cavity and gently passed to the oropharynx. Here the larynx was exposed and the patient was placed in suspension.     This revealed findings documented above. Photodocumentation was performed with the 0 and 70 degree telescope.    Then the transoral injector was used to inject voice gel laterally in the arcuate line of both vocal folds.     Photodocumentation was again performed.     This was the end of our procedure. Afrin soaked pledgets were applied to the surgical site for hemostasis.  The surgical site was then inspected and no residual bleeding was seen. The patient was taken out of suspension. The instrumentation was carefully removed, examining the mucosa and dentition on the way it. The dental guard was removed, and the mucosa and dentition were seen to be in their preoperative state at the conclusion of the procedure. The patient was then handed back to the care of anesthesia who awoke and extubated the patient without complication.    COMPLICATIONS:   None.  .   ESTIMATED BLOOD LOSS:   Less than 10cc    DISPOSITION:   PACU.    SPECIMENS:  * No specimens in log *       PHOTODOCUMENTATION:

## 2024-01-04 ENCOUNTER — PATIENT OUTREACH (OUTPATIENT)
Dept: OTOLARYNGOLOGY | Facility: CLINIC | Age: 34
End: 2024-01-04
Payer: COMMERCIAL

## 2024-01-04 NOTE — PROGRESS NOTES
Called patient to check in after procedure. Unable to leave a message, will follow up on MyChart. Taylor Zamudio RN on 1/4/2024 at 9:47 AM

## 2024-01-04 NOTE — PROGRESS NOTES
Called patient to check in after surgery. Per patient she is doing well, no trouble breathing or swallowing. Patient pain in tolerated with tylenol and ibuprofen. Patient verbalized understanding of the current plan or care and will reach out with any other questions or concerns. Taylor Zamudio RN on 1/4/2024 at 11:19 AM

## 2024-01-05 ENCOUNTER — VIRTUAL VISIT (OUTPATIENT)
Dept: OTOLARYNGOLOGY | Facility: CLINIC | Age: 34
End: 2024-01-05
Payer: COMMERCIAL

## 2024-01-05 DIAGNOSIS — J38.3 GLOTTIC INSUFFICIENCY: Primary | ICD-10-CM

## 2024-01-05 DIAGNOSIS — R49.0 DYSPHONIA: ICD-10-CM

## 2024-01-05 PROCEDURE — 92507 TX SP LANG VOICE COMM INDIV: CPT | Mod: GN | Performed by: SPEECH-LANGUAGE PATHOLOGIST

## 2024-01-05 NOTE — PROGRESS NOTES
Monserrat Moralez is a 33 year old female who is being evaluated via a billable video visit.      Monserrat has been notified and verbally consented to the following:     This video visit will be conducted between you and your provider.    Patient has opted to conduct today's video visit vs an in-person appointment.     Video visits are billed at different rates depending on your insurance coverage. Please reach out to your insurance provider with any questions.     If during the course of the call the provider feels the appointment is not appropriate, you will not be charged for this service.  Provider has received verbal consent for billable virtual visit from the patient? Yes  Will anyone else be joining your video visit? No    Call initiated at: 3:30 PM   Type of Visit Platform Used: [employer] Video  Location of provider: Home  Location of patient: [facility]  Harsh Mcfarland Jr., M.D., F.A.C.S.  Linda Sheldon M.D., M.P.H.  June Mathews M.D.  June Hale M.M. (voice), M.A., CCC-SLP  Tika Freeman M.S., CCC-SLP  Beata Jones, Ph.D., CCC-SLP  Phillip Fitch, Ph.D., CCC-SLP  Meme Abad M.S., CCC-SLP  Terrance Stafford M.M., M.A., CCC-SLP  ROBYN Hummel (voice), M.S., CCC-SLP    [facility]  VOICE/SPEECH/BREATHING THERAPY PROGRESS REPORT    Patient: Monserrat Moralez  Date of Service: 1/5/2024    Date of Last Service: 12/8/23  Referring physician: Dr. Mathews  Initial evaluation: 12/8/23  Therapy Session #2     I had the pleasure of seeing Ms. Moralez today, for speech therapy to address a diagnosis of:  Dysphonia (R49.0)   Glottic Insufficiency (J38.3)    PROGRESS SINCE LAST SESSION  Ms. Moralez was seen for evaluation and treatment on 12/8/23, and which time it was agreed that she should undergo an injections augmentation, with accompanying speech therapy.  Injection was attempted on 12/11, but unsuccessful, and redone in the OR on 1/3/24.  Bilateral injection was performed;  "please see Dr. Mathews's note.  My associate, Tika Freeman, provided therapeutic recommendations and instructions for breathing and post-op voice use and care.    Regarding practice, Ms. Moralez reports the followin-3 episodes of practice per day  o She is able to do 2 miinutes of sustained phonation (with pauses for inhalation) practice     The exercises feel beneficial    Ms. Moralez also states that:    Post-surgical pain today is better than yesterday; she is not experiencing as much pain, while taking the same pain medications  o She is not taking the dose in the middle of the night    Her voice is better than yesterday; it is more comfortable and also sounds better  o It sounds different than what it sounded like before  o Before she would try to find the right tone and stay there, but it would be hard to keep it there; now it is easier  o Low pitch is not comfortable, but high pitch is comfortable     \"It's a 'smidge' easier to produce my voice\"    It's also easier  to clear her throat    There is a little change in breathing; \"it feels a little more narrow, which I expected to happen\"  o Breathing at rest is fine  o Hasn't been back to work yet, which requires a lot more movement and is harder on breathing     Ms. Moralez presents today with the following:    Some noisy inhalation  Voice quality:    Marked strained/damped quality    Mild to moderate roughness    Bursts of near-normal quality at around A4    THERAPEUTIC ACTIVITIES  Today Ms. Moralez participated in the following therapeutic activities:    We reviewed the videos of her examination  o This demonstrated to her that her voice is better now, with more tone and less noise    I provided instruction for practicing her breathing techniques while exerted before returning to work.  o She should climb steps and practice, to ensure her breathing is safe for the exertion of her job    Demonstrated previous exercises.  o demonstrated adequate " "technique on the SOVT pitch glides, in the pitch range from E3 to D5  o instruction provided for increased level of complexity/difficulty; see below    [place] exercises for optimal airflow and forward sensation during phonation.  o Practiced pitch glides on several SOVT configurations and neutral syllables (\"whoo,\" \"hmm\" and the puffed upper lip configuration)  - I suggested she can return to SOVT configurations with straw phonation and water resistance if she finds that helpful  o Concentrated on finding the optimal pitch and sense of airflow for best phonatory quality   o Concentrated on abdominal relaxation for inhalation and easy flow for exhalation  - Much guidance provided for sensation of breathing    She took notes throughout the session.    IMPRESSIONS/GOALS/PLAN  Ms. Moralez had a productive session of speech therapy today, to address the following:  Dysphonia (R49.0)   Glottic Insufficiency (J38.3)    Speech therapy for her is medically necessary to allow  her to meet personal and professional demands and fully engage in activities of daily living.     She will continue to work on her exercises on a daily basis, and work on incorporating the techniques into her daily activities.    Goals for this practice period:     practice all exercises according to instructions    incorporate techniques into daily vocal activities    maintain vigilance for vocal technique    Plan: I will see Ms. Moralez in two weeks to work on education, modification, and carryover of therapeutic activities to more complex activities.    Next Clinic Appt: 1/25/24  Plan for SLP: join visit to help plan ongoing therapy    TOTAL SERVICE TIME: 57 minutes  TREATMENT ([id number])      Beata Jones, Ph.D., [facility]-SLP  Speech-Language Pathologist  Director, [facility] Voice [facility]  She/her/hers  [phone]              "

## 2024-01-05 NOTE — LETTER
1/5/2024       RE: Monserrat Moralez  [address] [facility] [address]     Dear Colleague,    Thank you for referring your patient, Monserrat Moralez, to the [facility] VOICE CLINIC [facility]. Please see a copy of my visit note below.    Monserrat Moralez is a 33 year old female who is being evaluated via a billable video visit.      Monserrat has been notified and verbally consented to the following:   This video visit will be conducted between you and your provider.  Patient has opted to conduct today's video visit vs an in-person appointment.   Video visits are billed at different rates depending on your insurance coverage. Please reach out to your insurance provider with any questions.   If during the course of the call the provider feels the appointment is not appropriate, you will not be charged for this service.  Provider has received verbal consent for billable virtual visit from the patient? Yes  Will anyone else be joining your video visit? No    Call initiated at: 3:30 PM   Type of Visit Platform Used: [employer]  Location of provider: Home  Location of patient: [facility] VOICE [facility]  Harsh Mcfarland Jr., M.D., F.A.C.S.  Linda Sheldon M.D., M.P.H.  June Mathews M.D.  June Hale M.M. (voice), M.A., CCC-SLP  Tika Freeman M.S., CCC-SLP  Beata Jones, Ph.D., CCC-SLP  Phillip Fitch, Ph.D., CCC-SLP  Meme Abad M.S., CCC-SLP  Terrance Stafford M.M., M.A., CCC-SLP  ROBYN Hummel (voice), M.S., CCC-SLP    [facility]  VOICE/SPEECH/BREATHING THERAPY PROGRESS REPORT    Patient: Monserrat Moralez  Date of Service: 1/5/2024    Date of Last Service: 12/8/23  Referring physician: Dr. Mathews  Initial evaluation: 12/8/23  Therapy Session #2     I had the pleasure of seeing Ms. Moralez today, for speech therapy to address a diagnosis of:  Dysphonia (R49.0)   Glottic Insufficiency (J38.3)    PROGRESS SINCE LAST  "SESSION  Ms. Moralez was seen for evaluation and treatment on 23, and which time it was agreed that she should undergo an injections augmentation, with accompanying speech therapy.  Injection was attempted on , but unsuccessful, and redone in the OR on 1/3/24.  Bilateral injection was performed; please see Dr. Mathews's note.  My associate, Tika Freeman, provided therapeutic recommendations and instructions for breathing and post-op voice use and care.    Regarding practice, Ms. Moralez reports the followin-3 episodes of practice per day  She is able to do 2 miinutes of sustained phonation (with pauses for inhalation) practice   The exercises feel beneficial    Ms. Moralez also states that:  Post-surgical pain today is better than yesterday; she is not experiencing as much pain, while taking the same pain medications  She is not taking the dose in the middle of the night  Her voice is better than yesterday; it is more comfortable and also sounds better  It sounds different than what it sounded like before  Before she would try to find the right tone and stay there, but it would be hard to keep it there; now it is easier  Low pitch is not comfortable, but high pitch is comfortable   \"It's a 'smidge' easier to produce my voice\"  It's also easier  to clear her throat  There is a little change in breathing; \"it feels a little more narrow, which I expected to happen\"  Breathing at rest is fine  Hasn't been back to work yet, which requires a lot more movement and is harder on breathing     Ms. Moralez presents today with the following:  Some noisy inhalation  Voice quality:  Marked strained/damped quality  Mild to moderate roughness  Bursts of near-normal quality at around A4    THERAPEUTIC ACTIVITIES  Today Ms. Moralez participated in the following therapeutic activities:  We reviewed the videos of her examination  This demonstrated to her that her voice is better now, with more tone and less noise  I " "provided instruction for practicing her breathing techniques while exerted before returning to work.  She should climb steps and practice, to ensure her breathing is safe for the exertion of her job  Demonstrated previous exercises.  demonstrated adequate technique on the SOVT pitch glides, in the pitch range from E3 to D5  instruction provided for increased level of complexity/difficulty; see below  [place] exercises for optimal airflow and forward sensation during phonation.  Practiced pitch glides on several SOVT configurations and neutral syllables (\"whoo,\" \"hmm\" and the puffed upper lip configuration)  I suggested she can return to SOVT configurations with straw phonation and water resistance if she finds that helpful  Concentrated on finding the optimal pitch and sense of airflow for best phonatory quality   Concentrated on abdominal relaxation for inhalation and easy flow for exhalation  Much guidance provided for sensation of breathing  She took notes throughout the session.    IMPRESSIONS/GOALS/PLAN  Ms. Moralez had a productive session of speech therapy today, to address the following:  Dysphonia (R49.0)   Glottic Insufficiency (J38.3)    Speech therapy for her is medically necessary to allow  her to meet personal and professional demands and fully engage in activities of daily living.     She will continue to work on her exercises on a daily basis, and work on incorporating the techniques into her daily activities.    Goals for this practice period:   practice all exercises according to instructions  incorporate techniques into daily vocal activities  maintain vigilance for vocal technique    Plan: I will see Ms. Moralez in two weeks to work on education, modification, and carryover of therapeutic activities to more complex activities.    Next Clinic Appt: 1/25/24  Plan for SLP: join visit to help plan ongoing therapy    TOTAL SERVICE TIME: 57 minutes  TREATMENT ([id number])      Beata Jones, Ph.D., " CCC-SLP  Speech-Language Pathologist  Director, [facility]  She/her/hers  [phone]

## 2024-01-19 ENCOUNTER — VIRTUAL VISIT (OUTPATIENT)
Dept: OTOLARYNGOLOGY | Facility: CLINIC | Age: 34
End: 2024-01-19
Payer: COMMERCIAL

## 2024-01-19 DIAGNOSIS — J38.3 GLOTTIC INSUFFICIENCY: ICD-10-CM

## 2024-01-19 DIAGNOSIS — R49.0 DYSPHONIA: Primary | ICD-10-CM

## 2024-01-19 PROCEDURE — 92507 TX SP LANG VOICE COMM INDIV: CPT | Mod: GN | Performed by: SPEECH-LANGUAGE PATHOLOGIST

## 2024-01-19 NOTE — LETTER
1/19/2024       RE: Monserrat Moralez  [address] [address] [facility] [address]     Dear Colleague,    Thank you for referring your patient, Monserrat Moralez, to the [facility] VOICE CLINIC [facility]. Please see a copy of my visit note below.    Monserrat Moralez is a 33 year old female who is being evaluated via a billable video visit.      Monserrat has been notified and verbally consented to the following:   This video visit will be conducted between you and your provider.  Patient has opted to conduct today's video visit vs an in-person appointment.   Video visits are billed at different rates depending on your insurance coverage. Please reach out to your insurance provider with any questions.   If during the course of the call the provider feels the appointment is not appropriate, you will not be charged for this service.  Provider has received verbal consent for billable virtual visit from the patient? Yes  Will anyone else be joining your video visit? No    Call initiated at: 9:30 AM   Type of Visit Platform Used: [email or web address] Video  Location of provider: Home  Location of patient: [facility] VOICE [facility]  Harsh Mcfarland Jr., M.D., F.A.C.S.  Linda Sheldon M.D., M.P.H.  June Mathews M.D.  June Hale M.M. (voice), M.A., CCC-SLP  Tika Freeman M.S., CCC-SLP  Beata Jones, Ph.D., CCC-SLP  Phillip Fitch, Ph.D., CCC-SLP  Meme Abad M.S., CCC-SLP  Terrance Stafford M.M., M.A., CCC-SLP  ROBYN Hummel (voice), M.S., CCC-SLP    [facility]  VOICE/SPEECH/BREATHING THERAPY PROGRESS REPORT    Patient: Monserrat Moralez  Date of Service: 1/19/2024    Date of Last Service: 1/5/24  Referring physician: Dr. Mathews  Initial evaluation: 12/8/24  Therapy Session #3     I had the pleasure of seeing Ms. Moralez today, for speech therapy to address a diagnosis of:  Dysphonia (R49.0)   Glottic Insufficiency (J38.3)    PROGRESS SINCE LAST  "SESSION  At the last session, Ms. Moralez worked on therapeutic activities to address the above diagnosis.    Regarding practice, Ms. Moralez reports the following:   Regular frequent practice   voice is not necessarily improved during the exercises, but \"it reinforces what I'm supposed to feel\"  Focusing on true vocal fold vibration has resulted in a lessening of the pain of phonation with the false vocal cords    Ms. Moralez also states that:  The breathing exercises work so well that she \"burnt myself out the first day back at work\"  She was able to be much more physically active than she has in the past  She plans to do more working out and growing into her body's abilities  Her managers were surprised at how much she could do     Ms. Moralez presents today with the following:  Voice quality:  At first, lack of periodicity and strained quality consistent with ventricular phonation  She states she thinks it is due to PND  Improved as the session continued  Occasional brief bursts of apparent true vocal fold vibration, with normal periodicity  Pitch is WNL at F3 to A3, when there is actual pitch  Cough/ Throat clear:  None observed    THERAPEUTIC ACTIVITIES  Today Ms. Moralez participated in the following therapeutic activities:  Demonstrated previous exercises.  demonstrated improved technique with better quality on the various glides on a hum, but used very small pitch range  appropriate redirection provided, to increase pitch range  Also redirection to start with aspirate onset, instead of a hard glottal or ventricular attack  instruction provided for increased level of complexity/difficulty; see below  Developed a protocol of exercises to elicit true ventricular retraction and vocal fold vibration  Inhalation phonation followed by exhalation phonation  In practice, this provided apparent true vocal fold phonation at high pitch with about 70% accuracy, and about 50% improved phonation on exhalation; required a " great deal of guidance and modeling  SOVT phonation with straw (with and without water resistance) and with the puffed upper lip configuration  Hum was less useful, but can be included in the protocol  Needed a great deal of practice to eliminate the hard glottal attack at onset  Guidance provided to attempt greater pitch range  I provided instruction on how to practice these configurations, and then use them during her laryngeal examination next week, to help determine which exercises may be the most useful to elicit true vocal fold vibration, and capitalize on the effects of the injection augmentation  I provided an after visit summary to help facilitate practice.    IMPRESSIONS/GOALS/PLAN  Ms. 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     She will continue to work on her exercises on a daily basis, and work on incorporating the techniques into her daily activities.    Plan: I will see Ms. Moralez in three weeks to work on education, modification, and carryover of therapeutic activities to more complex activities.    Next Clinic Appt: 1/25/24  Plan for SLP: join visit; guide her through the biofeedback exercises during laryngeal exam to help determine which exercises are most beneficial to retract ventricular folds and elicit true vocal fold vibration    TOTAL SERVICE TIME: 59 minutes  TREATMENT ([id number])      Beata Jones, Ph.D., [facility]-SLP  Speech-Language Pathologist  Director, [facility] Voice [facility]  She/her/hers  [phone]                Again, thank you for allowing me to participate in the care of your patient.      Sincerely,    Beata Jones, SLP

## 2024-01-19 NOTE — PROGRESS NOTES
"Monserrat Moralez is a 33 year old female who is being evaluated via a billable video visit.      Monserrat has been notified and verbally consented to the following:     This video visit will be conducted between you and your provider.    Patient has opted to conduct today's video visit vs an in-person appointment.     Video visits are billed at different rates depending on your insurance coverage. Please reach out to your insurance provider with any questions.     If during the course of the call the provider feels the appointment is not appropriate, you will not be charged for this service.  Provider has received verbal consent for billable virtual visit from the patient? Yes  Will anyone else be joining your video visit? No    Call initiated at: 9:30 AM   Type of Visit Platform Used: [employer] Video  Location of provider: Home  Location of patient: [facility] VOICE [facility]  Harsh Mcfarland Jr., M.D., F.A.C.S.  Linda Sheldon M.D., M.P.H.  June Mathews M.D.  June Hale M.M. (voice), M.A., CCC-SLP  Tika Freeman M.S., CCC-SLP  Beata Jones, Ph.D., CCC-SLP  Phillip Fitch, Ph.D., CCC-SLP  Meme Abad M.S., CCC-SLP  Terrance Stafford M.M., M.A., CCC-SLP  ROBYN Hummel (voice), M.S., CCC-SLP    [facility]  VOICE/SPEECH/BREATHING THERAPY PROGRESS REPORT    Patient: Monserrat Moralez  Date of Service: 1/19/2024    Date of Last Service: 1/5/24  Referring physician: Dr. Mathews  Initial evaluation: 12/8/24  Therapy Session #3     I had the pleasure of seeing Ms. 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 Moralez worked on therapeutic activities to address the above diagnosis.    Regarding practice, Ms. Moralez reports the following:     Regular frequent practice     voice is not necessarily improved during the exercises, but \"it reinforces what I'm supposed to " "feel\"    Focusing on true vocal fold vibration has resulted in a lessening of the pain of phonation with the false vocal cords    Ms. Naomy also states that:    The breathing exercises work so well that she \"burnt myself out the first day back at work\"  o She was able to be much more physically active than she has in the past  o She plans to do more working out and growing into her body's abilities  o Her managers were surprised at how much she could do     Ms. Moralez presents today with the following:  Voice quality:    At first, lack of periodicity and strained quality consistent with ventricular phonation  o She states she thinks it is due to PND  o Improved as the session continued    Occasional brief bursts of apparent true vocal fold vibration, with normal periodicity    Pitch is WNL at F3 to A3, when there is actual pitch  Cough/ Throat clear:    None observed    THERAPEUTIC ACTIVITIES  Today Ms. Moralez participated in the following therapeutic activities:    Demonstrated previous exercises.  o demonstrated improved technique with better quality on the various glides on a hum, but used very small pitch range  o appropriate redirection provided, to increase pitch range  o Also redirection to start with aspirate onset, instead of a hard glottal or ventricular attack  o instruction provided for increased level of complexity/difficulty; see below    Developed a protocol of exercises to elicit true ventricular retraction and vocal fold vibration  o Inhalation phonation followed by exhalation phonation  - In practice, this provided apparent true vocal fold phonation at high pitch with about 70% accuracy, and about 50% improved phonation on exhalation; required a great deal of guidance and modeling  o SOVT phonation with straw (with and without water resistance) and with the puffed upper lip configuration    Hum was less useful, but can be included in the protocol  - Needed a great deal of practice to eliminate " the hard glottal attack at onset  - Guidance provided to attempt greater pitch range  o I provided instruction on how to practice these configurations, and then use them during her laryngeal examination next week, to help determine which exercises may be the most useful to elicit true vocal fold vibration, and capitalize on the effects of the injection augmentation    I provided an after visit summary to help facilitate practice.    IMPRESSIONS/GOALS/PLAN  Ms. 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     She will continue to work on her exercises on a daily basis, and work on incorporating the techniques into her daily activities.    Plan: I will see Ms. Moralez in three weeks to work on education, modification, and carryover of therapeutic activities to more complex activities.    Next Clinic Appt: 1/25/24  Plan for SLP: join visit; guide her through the biofeedback exercises during laryngeal exam to help determine which exercises are most beneficial to retract ventricular folds and elicit true vocal fold vibration    TOTAL SERVICE TIME: 59 minutes  TREATMENT ([id number])      Beata Jones, Ph.D., [facility]-SLP  Speech-Language Pathologist  Director, [facility] Voice [facility]  She/her/hers  [phone]

## 2024-01-21 NOTE — PATIENT INSTRUCTIONS
After Visit Summary    Patient: Monserrat Moralez  Date of Visit: 1/19/2024    Here are the exercises to practice, and then do during your endoscopic laryngeal exam, while you can all watch your larynx, and determine which exercises help you achieve normal true-vocal-fold vibration.    - straw phonation, with and without bubbles: go up and down in pitch, make sure your effort is low    - phonation with the puffed-upper-lip configuration: enjoy the forward sensation and awareness of easy airflow   Remember to start with an inhalation as if you were sucking cool air around a piece of hot baked potato in our mouth; it should help you feel a sensation of open space in the back of your throat    - inhalation phonation followed (immediately) by exhalation phonation: it's fine if it is very high in pitch, but then try to match the pitch as you exhale, and then let the pitch glide down    I'll be eager to hear about it!    Best wishes!  Dr. JACLYN Jones

## 2024-01-23 NOTE — PROGRESS NOTES
[facility]   at the [facility]   Otolaryngology Clinic     Patient: Monserrat Moralez    MRN: [id number]    : 1990    Age/Gender: 33 year old female  Date of Service: 2024  Rendering Provider:   June Mathews MD     Chief Complaint   Dysphonia  S/p Microdirect suspension laryngoscopy with injection laryngoplasty with voice gel to the right and left vocal fold 1/3/24  Interval History   HISTORY OF PRESENT ILLNESS: Ms. Moralez is a 33 year old female is being followed for dysphonia. she was initially seen on 23. Please refer to this note for full history.     Of note she had a thyroplasty at 17 and a reconstruction surgery at age 5.     Today, she presents for follow up. she reports:  - ***     PAST MEDICAL HISTORY: No past medical history on file.    PAST SURGICAL HISTORY:   Past Surgical History:   Procedure Laterality Date    LARYNGOSCOPY N/A 1/3/2024    Procedure: MICROLARYNGOSCOPY with vocal fold injection with voice gel;  Surgeon: June Mathews MD;  Location: [facility] OR    LARYNGOSCOPY, FLEXIBLE WITH INJECTION N/A 2023    Procedure: LARYNGOSCOPY, FLEXIBLE WITH INJECTION;  Surgeon: June Mathews MD;  [place] [facility] [place]       CURRENT MEDICATIONS:   Current Outpatient Medications:     acetaminophen (TYLENOL) 325 MG tablet, Take 2 tablets (650 mg) by mouth every 4 hours as needed for mild pain, Disp: 50 tablet, Rfl: 0    ondansetron (ZOFRAN ODT) 4 MG ODT tab, Take 1 tablet (4 mg) by mouth every 8 hours as needed for nausea, Disp: 4 tablet, Rfl: 0    ALLERGIES: Soap and Sulfa antibiotics    SOCIAL HISTORY:    Social History     Socioeconomic History    Marital status:      Spouse name: Not on file    Number of children: Not on file    Years of education: Not on file    Highest education level: Not on file   Occupational History    Not on file   Tobacco Use    Smoking status: Never    Smokeless tobacco: Never   Substance and Sexual Activity    Alcohol use: Never     Drug use: Never    Sexual activity: Not on file   Other Topics Concern    Not on file   Social History Narrative    Not on file     Social Determinants of Health     Financial Resource Strain: Not on file   Food Insecurity: Not on file   Transportation Needs: Not on file   Physical Activity: Not on file   Stress: Not on file   Social Connections: Not on file   Interpersonal Safety: Not on file   Housing Stability: Not on file         FAMILY HISTORY: No family history on file.   Non-contributory for problems with anesthesia    REVIEW OF SYSTEMS:   The patient was asked a 14 point review of systems regarding constitutional symptoms, eye symptoms, ears, nose, mouth, throat symptoms, cardiovascular symptoms, respiratory symptoms, gastrointestinal symptoms, genitourinary symptoms, musculoskeletal symptoms, integumentary symptoms, neurological symptoms, psychiatric symptoms, endocrine symptoms, hematologic/lymphatic symptoms, and allergic/ immunologic symptoms.   The pertinent factors have been included in the HPI and below.  Patient Supplied Answers to Review of Systems      12/8/2023    10:41 AM   [facility] ROS   Ears, Nose, Throat Ringing/noise in ears    Hoarseness       Physical Examination   The patient underwent a physical examination as described below. The pertinent positive and negative findings are summarized after the description of the examination.  Constitutional: The patient's developmental and nutritional status was assessed. The patient's voice quality was assessed.  Head and Face: The head and face were inspected for deformities. The sinuses were palpated. The salivary glands were palpated. Facial muscle strength was assessed bilaterally.  Eyes: Extraocular movements and primary gaze alignment were assessed.  Ears, Nose, Mouth and Throat: The ears and nose were examined for deformities. The nasal septum, mucosa, and turbinates were inspected by anterior rhinoscopy. The lips, teeth, and gums were examined  for abnormalities. The oral mucosa, tongue, palate, tonsils, lateral and posterior pharynx were inspected for the presence of asymmetry or mucosal lesions.    Neck: The tracheal position was noted, and the neck mass palpated to determine if there were any asymmetries, abnormal neck masses, thyromegally, or thyroid nodules.  Respiratory: The nature of the breathing and chest expansion/symmetry was observed.  Cardiovascular: The patient was examined to determine the presence of any edema or jugular venous distension.  Abdomen: The contour of the abdomen was noted.  Lymphatic: The patient was examined for infraclavicular lymphadenopathy.  Musculoskeletal: The patient was inspected for the presence of skeletal deformities.  Extremities: The extremities were examined for any clubbing or cyanosis.  Skin: The skin was examined for inflammatory or neoplastic conditions.  Neurologic: The patient's orientation, mood, and affect were noted. The cranial nerve  functions were examined.  Other pertinent positive and negative findings on physical examination:   OC/OP: no lesions, uvula midline, soft palate elevates symmetrically   Neck: no lesions, no TH tenderness to palpation  ***  All other physical examination findings were within normal limits and noncontributory.    Procedures       Flexible laryngoscopy (CPT 02300)        Pre-procedure diagnosis: dysphonia  Post-procedure diagnosis: same as above  Indication for procedure: Ms. Moralez is a 33 year old female with see above  Procedure(s): Fiberoptic Laryngoscopy     Details of Procedure: After informed consent was obtained, the patient was seated in the examination chair.  The areas of the nasopharynx as well as the hypopharynx were anesthetized with topical 4% lidocaine with 0.25% phenylephrine atomizer.  Examination of the base of tongue was performed first.  Attention was directed to any evidence of masses in the area or evidence of leukoplakia or candidal infection.   Attention was directed to the epiglottis where its size and position was determined and its movement on phonation of the vowel  e .  The piriform sinuses were then inspected for any mass lesions or pooling of secretions.  Attention was then directed to the larynx. The vocal folds were inspected for infection or any areas of leukoplakia, for masses, polypoid degeneration, or hemorrhage.  Having done this, the arytenoids and vocal processes were inspected for erythema or evidence of granuloma formation.  The posterior commissure was then inspected for evidence of inflammatory changes in the mucosa and heaping up of mucosal tissue. The patient was then instructed to say the vowel  e .  Adduction of vocal folds to the midline was observed for any evidence of paresis or paralysis of the larynx or asymmetry in rotation of the larynx to the left or right. The patient was asked to breathe and the degree of abduction was noted bilaterally.  Subglottic view of the larynx was obtained for any additional mass lesions or mucosal changes.  Finally the post cricoid was examined for evidence of pooling of secretions, as well as the pharyngeal wall mucosa.   Anesthesia type: 0.25% phenylephrine     Findings:  Anatomic/physiological deviations: LNC, anterior commissure is not sharp, right vocal fold insertion is posterior and lower than the left vocal fold, very large glottic gap, uses ventricular phonation, pediatric scope               Right vocal process: No restriction of mobility   Left vocal process: No restriction of mobility  Glottal gap: Glottal gap  Supraglottic structures: Normal  Hypopharynx: Normal      Estimated Blood Loss: minimal  Complications: None  Disposition: Patient tolerated the procedure well     ***               Fiberoptic Endoscopic Evaluation of Swallowing (CPT 87377)  and Interpretation of Swallowing (CPT 77196)     Indications: See above notes for complete history and physical. Patient complains of  "dysphagia to both solids and liquids and/or there is suggestion on history and endoscopic exam of the presence of dysphagia causing medical complaints.  Swallowing evaluation is being performed to assess the presence and degree of dysphagia, and to recommend a safe diet.     Pulmonary Status:        No PNA   Current Diet:                regular                                                    thin liquids      Consistency Amounts:  Thin Liquid: sip   Puree: sip  Solid: cookies            Positioning: upright in a chair  Oral Peripheral Exam: See physical exam section.  Anatomic Notes: See Videostroboscopy report for assessment of anatomy and laryngeal functioning  Pharyngeal secretions prior to administration of liquid or food: No   Oral Phase Abnormal Findings: No abnormal behavior observed   Pharyngeal Phase Abnormal Findings:  mild vallecular residue with hard boiled egg, cleared with liquid wash      Recommended Diet:  regular                                        thin liquids      ***    Review of Relevant Clinical Data   I personally reviewed:  Notes: Dr. Beata Jones, SLP, 1/19/24  IMPRESSIONS/GOALS/PLAN  Ms. 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      She will continue to work on her exercises on a daily basis, and work on incorporating the techniques into her daily activities.     Plan: I will see Ms. Moralez in three weeks to work on education, modification, and carryover of therapeutic activities to more complex activities.    Radiology: CT neck 12/8/23  Impression:  No evident mass or adenopathy within the neck.   Pathology: ***  Procedures: ***  Labs:  No results found for: \"TSH\"  Lab Results   Component Value Date     01/22/2022    CO2 25 " "01/22/2022    BUN 19 01/22/2022     Lab Results   Component Value Date    WBC 7.5 01/22/2022    HGB 15.6 01/22/2022    HCT 48.2 (H) 01/22/2022    MCV 92 01/22/2022     01/22/2022     No results found for: \"PT\", \"PTT\", \"INR\"  No results found for: \"LEDY\"  No components found for: \"RHEUMATOIDFACTOR\", \"RF\"  No results found for: \"CRP\"  No components found for: \"CKTOT\", \"URICACID\"  No components found for: \"C3\", \"C4\", \"DSDNAAB\", \"NDNAABIFA\"  No results found for: \"MPOAB\"    Patient reported Quality of Life (QOL) Measures   Patient Supplied Answers To VHI Questionnaire       No data to display                  Patient Supplied Answers To EAT Questionnaire       No data to display                  Patient Supplied Answers To CSI Questionnaire       No data to display                  Patient Supplied Answers to Dyspnea Index Questionnaire:       No data to display                Impression & Plan     IMPRESSION: Ms. Moralez is a 33 year old female who is being seen for the following:    Dysphonia  - in the setting of 5 year old reconstruction and 17 year old medialization   - loses voice after 1.5 hours  -  worse with exertion of her voice   - speaking voice is affected   - singing voice is affected   -  pain with voice use  - voice demand is high, as she works at [employer]  - scope evaluation shows anterior commissure is not sharp, right vocal fold insertion is posterior and lower than the left vocal fold, very large glottic gap, uses ventricular phonation   - symptoms due to glottic gap from prior reconstructive surgery  - dicussed we can trial bilateral vocal fold injections with voice gel to see if this can improve her voice  - discussed limited benefits vs no benefits, discussed optimizing her breathing as well given vocal fold dysfunction, if the injection is helpful, then proceed with a fat injection as a long term solution  - do not see evidence of prior implant today, will need complete imaging, and obtain " records  - neck CT scan 12/8/23 was normal  - s/p Microdirect suspension laryngoscopy with injection laryngoplasty with voice gel to the right and left vocal fold 1/3/24  - symptoms 1/25/2024 are ***  - scope shows ***  Plan  -***  - voice therapy, need one session before injection  - obtain records of prior thyroplasty  - schedule trial vocal fold injections        2. Dysphagia  - in the setting of 5 year old reconstruction and 17 year old medialization  - solids, such as meats, eggs, cereal, and noodles, get hung up in the upper part of her throat  - throat clearing and drinking water normally helps  - frequency: all of the time   - pneumonias denies   - weight changes denies   - reflux denies  - GI work up denies  - imaging work up denies   - scope shows anterior commissure is not sharp, right vocal fold insertion is posterior and lower than the left vocal fold, very large glottic gap, uses ventricular phonation  - FEES shows mild vallecular residue with hard boiled egg, cleared with liquid wash    - symptoms due to muscle tension dysphagia    - s/p Microdirect suspension laryngoscopy with injection laryngoplasty with voice gel to the right and left vocal fold 1/3/24  - symptoms 1/25/2024 are ***  - scope shows ***  - FEES show  Plan   - safe swallow precaution      Plan  - ***    RETURN VISIT: ***    Scribe Disclosure:   I, Eryn Howe, am serving as a scribe; to document services personally performed by June Mathews MD -based on data collection and the provider's statements to me.     Provider Disclosure:  I agree with above History, Review of Systems, Physical exam and Plan.  I have reviewed the content of the documentation and have edited it as needed. I have personally performed the services documented here and the documentation accurately represents those services and the decisions I have made.        June Mathews MD    Laryngology    [facility]  Department of   Otolaryngology - Head and Neck Surgery  Clinics & Surgery Center  [address] [place] [id number]  Appointment line: [phone]  Fax: [phone]  [email or web address]

## 2024-01-25 ENCOUNTER — OFFICE VISIT (OUTPATIENT)
Dept: OTOLARYNGOLOGY | Facility: CLINIC | Age: 34
End: 2024-01-25
Payer: COMMERCIAL

## 2024-01-25 ENCOUNTER — VIRTUAL VISIT (OUTPATIENT)
Dept: OTOLARYNGOLOGY | Facility: CLINIC | Age: 34
End: 2024-01-25
Payer: COMMERCIAL

## 2024-01-25 ENCOUNTER — TELEPHONE (OUTPATIENT)
Dept: OTOLARYNGOLOGY | Facility: CLINIC | Age: 34
End: 2024-01-25

## 2024-01-25 DIAGNOSIS — J38.3 GLOTTIC INSUFFICIENCY: ICD-10-CM

## 2024-01-25 DIAGNOSIS — R49.0 DYSPHONIA: ICD-10-CM

## 2024-01-25 DIAGNOSIS — R49.0 DYSPHONIA: Primary | ICD-10-CM

## 2024-01-25 DIAGNOSIS — Z98.890 HISTORY OF LARYNGOPLASTY: Primary | ICD-10-CM

## 2024-01-25 DIAGNOSIS — J38.3 PARADOXICAL VOCAL FOLD MOVEMENT: ICD-10-CM

## 2024-01-25 PROCEDURE — 99207 PR NO CHARGE LOS: CPT

## 2024-01-25 PROCEDURE — 99214 OFFICE O/P EST MOD 30 MIN: CPT | Mod: 93 | Performed by: OTOLARYNGOLOGY

## 2024-01-25 NOTE — TELEPHONE ENCOUNTER
Dr. Mathews returned call to patient to discuss how she is doing after surgery as she missed her in person visit. Please see telephone visit note from today from Dr. Mathews for further details.     Karla Conner, RN, BSN

## 2024-01-25 NOTE — LETTER
2024       RE: Monserrat Moralez  [id number] [facility] [address]     Dear Colleague,    Thank you for referring your patient, Monserrat Moralez, to the [facility] EAR NOSE AND THROAT CLINIC [place] at [facility]. Please see a copy of my visit note below.    Monserrat is a 33 year old who is being evaluated via a billable telephone visit.      What phone number would you like to be contacted at? [phone]   How would you like to obtain your AVS? MyChart    Distant Location (provider location):  On-site  Phone call duration: 10 minutes    0    [facility]   at the [facility]   Otolaryngology Clinic     Patient: Monserrat Moralez    MRN: [id number]    : 1990    Age/Gender: 33 year old female  Date of Service: 2024  Rendering Provider:   June Mathews MD     Chief Complaint   Dysphonia  s/p MDL with bilateral voice gel injection laryngoplasty 1/3/24  Interval History   HISTORY OF PRESENT ILLNESS: Ms. Moralez is a 33 year old female is being followed for dysphonia. she was initially seen on 23. Please refer to this note for full history.     Of note she had a thyroplasty at 17 and a reconstruction surgery at age 5.     Today, she presents via telephone. she reports :  - felt pain after her procedure  - feels worse in the morning  - denies breathing problems         PAST MEDICAL HISTORY: No past medical history on file.    PAST SURGICAL HISTORY:   Past Surgical History:   Procedure Laterality Date    LARYNGOSCOPY N/A 1/3/2024    Procedure: MICROLARYNGOSCOPY with vocal fold injection with voice gel;  Surgeon: June Mathews MD;  Location: [facility] OR    LARYNGOSCOPY, FLEXIBLE WITH INJECTION N/A 2023    Procedure: LARYNGOSCOPY, FLEXIBLE WITH INJECTION;  Surgeon: June Mathews MD;  Location: [facility] OR       CURRENT MEDICATIONS:   Current Outpatient Medications:     acetaminophen (TYLENOL) 325 MG tablet, Take 2  tablets (650 mg) by mouth every 4 hours as needed for mild pain, Disp: 50 tablet, Rfl: 0    ondansetron (ZOFRAN ODT) 4 MG ODT tab, Take 1 tablet (4 mg) by mouth every 8 hours as needed for nausea, Disp: 4 tablet, Rfl: 0    ALLERGIES: Soap and Sulfa antibiotics    SOCIAL HISTORY:    Social History     Socioeconomic History    Marital status:      Spouse name: Not on file    Number of children: Not on file    Years of education: Not on file    Highest education level: Not on file   Occupational History    Not on file   Tobacco Use    Smoking status: Never    Smokeless tobacco: Never   Substance and Sexual Activity    Alcohol use: Never    Drug use: Never    Sexual activity: Not on file   Other Topics Concern    Not on file   Social History Narrative    Not on file     Social Determinants of Health     Financial Resource Strain: Not on file   Food Insecurity: Not on file   Transportation Needs: Not on file   Physical Activity: Not on file   Stress: Not on file   Social Connections: Not on file   Interpersonal Safety: Not on file   Housing Stability: Not on file         FAMILY HISTORY: No family history on file.   Non-contributory for problems with anesthesia    REVIEW OF SYSTEMS:   The patient was asked a 14 point review of systems regarding constitutional symptoms, eye symptoms, ears, nose, mouth, throat symptoms, cardiovascular symptoms, respiratory symptoms, gastrointestinal symptoms, genitourinary symptoms, musculoskeletal symptoms, integumentary symptoms, neurological symptoms, psychiatric symptoms, endocrine symptoms, hematologic/lymphatic symptoms, and allergic/ immunologic symptoms.   The pertinent factors have been included in the HPI and below.  Patient Supplied Answers to Review of Systems      12/8/2023    10:41 AM   [facility] ROS   Ears, Nose, Throat Ringing/noise in ears    Hoarseness       Physical Examination   The patient underwent a physical examination as described below. The pertinent positive  "and negative findings are summarized after the description of the examination.  Neurologic: The patient's orientation, mood, and affect were noted.   Other pertinent positive and negative findings on physical examination:   Breathing comfortably on room air, no stridor with deep inspiration   no throat clearing throughout the visit     All other physical examination findings were within normal limits and noncontributory     Review of Relevant Clinical Data   I personally reviewed:  Notes: Dr. Beata Jones, SLP, 1/19/24  IMPRESSIONS/GOALS/PLAN  Ms. 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      She will continue to work on her exercises on a daily basis, and work on incorporating the tech  niques into her daily activities.     Plan: I will see Ms. Moralez in three weeks to work on education, modification, and carryover of therapeutic activities to more complex activities.    Radiology: CT neck 12/8/23  Impression:  No evident mass or adenopathy within the neck.  Pathology:   Procedures:   Labs:  No results found for: \"TSH\"  Lab Results   Component Value Date     01/22/2022    CO2 25 01/22/2022    BUN 19 01/22/2022     Lab Results   Component Value Date    WBC 7.5 01/22/2022    HGB 15.6 01/22/2022    HCT 48.2 (H) 01/22/2022    MCV 92 01/22/2022     01/22/2022     No results found for: \"PT\", \"PTT\", \"INR\"  No results found for: \"LEDY\"  No components found for: \"RHEUMATOIDFACTOR\", \"RF\"  No results found for: \"CRP\"  No components found for: \"CKTOT\", \"URICACID\"  No components found for: \"C3\", \"C4\", \"DSDNAAB\", \"NDNAABIFA\"  No results found for: \"MPOAB\"    Patient reported Quality of Life (QOL) Measures   Patient Supplied Answers To VHI Questionnaire       No data to " display                  Patient Supplied Answers To EAT Questionnaire       No data to display                  Patient Supplied Answers To CSI Questionnaire       No data to display                  Patient Supplied Answers to Dyspnea Index Questionnaire:       No data to display                 Impression & Plan     IMPRESSION: Ms. Moralez is a 33 year old female who is being seen for the following:    Dysphonia  - in the setting of 5 year old reconstruction and 17 year old medialization   - loses voice after 1.5 hours  -  worse with exertion of her voice   - speaking voice is affected   - singing voice is affected   -  pain with voice use  - voice demand is high, as she works at [employer]  - scope evaluation shows anterior commissure is not sharp, right vocal fold insertion is posterior and lower than the left vocal fold, very large glottic gap, uses ventricular phonation   - symptoms due to glottic gap from prior reconstructive surgery  - dicussed we can trial bilateral vocal fold injections with voice gel to see if this can improve her voice  - discussed limited benefits vs no benefits, discussed optimizing her breathing as well given vocal fold dysfunction, if the injection is helpful, then proceed with a fat injection as a long term solution  - do not see evidence of prior implant today, will need complete imaging, and obtain records  - s/p MDL with bilateral voice gel injection laryngoplasty 1/3/24  - symptoms 1/25/2024 are not necessarily improved, still feels effort and difficulty talking, voice is not louder or stronger  - discussed that at this point an injection with fat would not be indicated since she did not feel benefit from the temporary agent  - encouraged her to keep working therapy and if she notices a change in her voice after the material goes away then we can consider a injection laryngoplasty with fat at that point  Plan  - continue therapy  - call back if realizes benefit from injection  laryngoplasty with voice gel and can schedule surgery for injection laryngoplasty with fat       2. Dysphagia  - in the setting of 5 year old reconstruction and 17 year old medialization  - solids, such as meats, eggs, cereal, and noodles, get hung up in the upper part of her throat  - throat clearing and drinking water normally helps  - frequency: all of the time   - pneumonias denies   - weight changes denies   - reflux denies  - GI work up denies  - imaging work up denies   - scope shows anterior commissure is not sharp, right vocal fold insertion is posterior and lower than the left vocal fold, very large glottic gap, uses ventricular phonation  - FEES shows mild vallecular residue with hard boiled egg, cleared with liquid wash    - symptoms due to muscle tension dysphagia    Plan   - safe swallow precaution      RETURN VISIT: as needed    Scribe Disclosure:   I, Eryn Howe, am serving as a scribe; to document services personally performed by June Mathews MD -based on data collection and the provider's statements to me.     Provider Disclosure:  I agree with above History, Review of Systems, Physical exam and Plan.  I have reviewed the content of the documentation and have edited it as needed. I have personally performed the services documented here and the documentation accurately represents those services and the decisions I have made.        June Mathews MD    Laryngology    [facility]  Department of  Otolaryngology - Head and Neck Surgery    Clinics [place] [address]  Appointment line: [phone]  Fax: [phone]  [email or web address]      15 minutes spent on the date of the encounter doing chart review, patient visit, and documentation        Again, thank you for allowing me to participate in the care of your patient.      Sincerely,    June Mathews MD

## 2024-01-25 NOTE — PROGRESS NOTES
[facility] VOICE CLINIC    Patient: Monserrat Moralez  Date of Visit: 1/25/2024    CHIEF COMPLAINT: Voice    HISTORY  Monserrat Moralez is a 33 year old year old woman, who was scheduled to be seen for follow-up evaluation in conjunction with a visit to Dr. June Mathews.      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 Laryngeal evaluation shows significant ongoing changes to the anterior commissure with approximately 4 to 5 mm of space  the anterior most portion of the left and right vocal folds with likely vertical height mismatch.  Although true phonation is appreciated particularly with elevated pitch, primary source of phonation for communication happens via vibration of the ventricular folds.  This explains audio perceptual quality which is dominated by roughness, strain, and breathiness which is in turn echoed by laryngeal function studies which show substantially increased trans glottal airflow, profoundly increased in for subglottal pressure, and significantly elevated AVQI.  Beyond voice quality patient's episodic breathing difficulties during exertion likely have a role of paradoxical vocal fold motion, and she was stimulable for improvement in abduction with rescue breathing strategies (pursed lip breathing).     INTERVAL HISTORY:    12/11/23 -injection augmentation was attempted but was unable to be completed awake    1/3/23 -injection augmentation was completed with Dr. Mathews under anesthesia with no significant complication.    1/25/2024 - author: Terrance Stafford - patient  returns today for planned postprocedural follow-up.  She has had 2 sessions of virtual therapy with Beata Jones, PhD-SLP and she reports improvement with the use of breathing exercises.      Patient was scheduled to be seen in clinic, but did not present at her appointment time.  Later patient was able to connect with Dr. Mathews via telephone for an update.  Please see that information in Dr. Mathews's note from today's date.  As she did not present physically in clinic she was not able to be evaluated by speech services, and there is correspondingly no charge for this encounter.    Kevin Stafford M.M., M.A., CCC-SLP  Speech-Language Pathologist  Certificate of Vocology  [phone]    *this report was created in part through the use of computerized dictation software, and though reviewed following completion, some typographic errors may persist.  If there is confusion regarding any of this notes contents, please contact me for clarification.*

## 2024-01-25 NOTE — PROGRESS NOTES
Monserrat is a 33 year old who is being evaluated via a billable telephone visit.      What phone number would you like to be contacted at? [phone]   How would you like to obtain your AVS? Amelia    Distant Location (provider location):  On-site  Phone call duration: 10 minutes    0    [facility]   at the [facility]   Otolaryngology Clinic     Patient: Monserrat Moralez    MRN: [id number]    : 1990    Age/Gender: 33 year old female  Date of Service: 2024  Rendering Provider:   June Mathews MD     Chief Complaint   Dysphonia  s/p MDL with bilateral voice gel injection laryngoplasty 1/3/24  Interval History   HISTORY OF PRESENT ILLNESS: Ms. Moralez is a 33 year old female is being followed for dysphonia. she was initially seen on 23. Please refer to this note for full history.     Of note she had a thyroplasty at 17 and a reconstruction surgery at age 5.     Today, she presents via telephone. she reports :  - felt pain after her procedure  - feels worse in the morning  - denies breathing problems         PAST MEDICAL HISTORY: No past medical history on file.    PAST SURGICAL HISTORY:   Past Surgical History:   Procedure Laterality Date    LARYNGOSCOPY N/A 1/3/2024    Procedure: MICROLARYNGOSCOPY with vocal fold injection with voice gel;  Surgeon: June Mathews MD;  Location: [facility] OR    LARYNGOSCOPY, FLEXIBLE WITH INJECTION N/A 2023    Procedure: LARYNGOSCOPY, FLEXIBLE WITH INJECTION;  Surgeon: June Mathews MD;  Location: [facility] OR       CURRENT MEDICATIONS:   Current Outpatient Medications:     acetaminophen (TYLENOL) 325 MG tablet, Take 2 tablets (650 mg) by mouth every 4 hours as needed for mild pain, Disp: 50 tablet, Rfl: 0    ondansetron (ZOFRAN ODT) 4 MG ODT tab, Take 1 tablet (4 mg) by mouth every 8 hours as needed for nausea, Disp: 4 tablet, Rfl: 0    ALLERGIES: Soap and Sulfa antibiotics    SOCIAL HISTORY:    Social History     Socioeconomic History    Marital  status:      Spouse name: Not on file    Number of children: Not on file    Years of education: Not on file    Highest education level: Not on file   Occupational History    Not on file   Tobacco Use    Smoking status: Never    Smokeless tobacco: Never   Substance and Sexual Activity    Alcohol use: Never    Drug use: Never    Sexual activity: Not on file   Other Topics Concern    Not on file   Social History Narrative    Not on file     Social Determinants of Health     Financial Resource Strain: Not on file   Food Insecurity: Not on file   Transportation Needs: Not on file   Physical Activity: Not on file   Stress: Not on file   Social Connections: Not on file   Interpersonal Safety: Not on file   Housing Stability: Not on file         FAMILY HISTORY: No family history on file.   Non-contributory for problems with anesthesia    REVIEW OF SYSTEMS:   The patient was asked a 14 point review of systems regarding constitutional symptoms, eye symptoms, ears, nose, mouth, throat symptoms, cardiovascular symptoms, respiratory symptoms, gastrointestinal symptoms, genitourinary symptoms, musculoskeletal symptoms, integumentary symptoms, neurological symptoms, psychiatric symptoms, endocrine symptoms, hematologic/lymphatic symptoms, and allergic/ immunologic symptoms.   The pertinent factors have been included in the HPI and below.  Patient Supplied Answers to Review of Systems      12/8/2023    10:41 AM   [facility] ENT ROS   Ears, Nose, Throat Ringing/noise in ears    Hoarseness       Physical Examination   The patient underwent a physical examination as described below. The pertinent positive and negative findings are summarized after the description of the examination.  Neurologic: The patient's orientation, mood, and affect were noted.   Other pertinent positive and negative findings on physical examination:   Breathing comfortably on room air, no stridor with deep inspiration   no throat clearing throughout the visit  "    All other physical examination findings were within normal limits and noncontributory     Review of Relevant Clinical Data   I personally reviewed:  Notes: Dr. Beata Jones, SLP, 1/19/24  IMPRESSIONS/GOALS/PLAN  Ms. 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      She will continue to work on her exercises on a daily basis, and work on incorporating the tech  niques into her daily activities.     Plan: I will see Ms. Moralez in three weeks to work on education, modification, and carryover of therapeutic activities to more complex activities.    Radiology: CT neck 12/8/23  Impression:  No evident mass or adenopathy within the neck.  Pathology:   Procedures:   Labs:  No results found for: \"TSH\"  Lab Results   Component Value Date     01/22/2022    CO2 25 01/22/2022    BUN 19 01/22/2022     Lab Results   Component Value Date    WBC 7.5 01/22/2022    HGB 15.6 01/22/2022    HCT 48.2 (H) 01/22/2022    MCV 92 01/22/2022     01/22/2022     No results found for: \"PT\", \"PTT\", \"INR\"  No results found for: \"LEDY\"  No components found for: \"RHEUMATOIDFACTOR\", \"RF\"  No results found for: \"CRP\"  No components found for: \"CKTOT\", \"URICACID\"  No components found for: \"C3\", \"C4\", \"DSDNAAB\", \"NDNAABIFA\"  No results found for: \"MPOAB\"    Patient reported Quality of Life (QOL) Measures   Patient Supplied Answers To VHI Questionnaire       No data to display                  Patient Supplied Answers To EAT Questionnaire       No data to display                  Patient Supplied Answers To CSI Questionnaire       No data to display                  Patient Supplied Answers to Dyspnea Index Questionnaire:       No data to display                 Impression & Plan     IMPRESSION: Ms. " Naomy is a 33 year old female who is being seen for the following:    Dysphonia  - in the setting of 5 year old reconstruction and 17 year old medialization   - loses voice after 1.5 hours  -  worse with exertion of her voice   - speaking voice is affected   - singing voice is affected   -  pain with voice use  - voice demand is high, as she works at [employer]  - scope evaluation shows anterior commissure is not sharp, right vocal fold insertion is posterior and lower than the left vocal fold, very large glottic gap, uses ventricular phonation   - symptoms due to glottic gap from prior reconstructive surgery  - dicussed we can trial bilateral vocal fold injections with voice gel to see if this can improve her voice  - discussed limited benefits vs no benefits, discussed optimizing her breathing as well given vocal fold dysfunction, if the injection is helpful, then proceed with a fat injection as a long term solution  - do not see evidence of prior implant today, will need complete imaging, and obtain records  - s/p MDL with bilateral voice gel injection laryngoplasty 1/3/24  - symptoms 1/25/2024 are not necessarily improved, still feels effort and difficulty talking, voice is not louder or stronger  - discussed that at this point an injection with fat would not be indicated since she did not feel benefit from the temporary agent  - encouraged her to keep working therapy and if she notices a change in her voice after the material goes away then we can consider a injection laryngoplasty with fat at that point  Plan  - continue therapy  - call back if realizes benefit from injection laryngoplasty with voice gel and can schedule surgery for injection laryngoplasty with fat       2. Dysphagia  - in the setting of 5 year old reconstruction and 17 year old medialization  - solids, such as meats, eggs, cereal, and noodles, get hung up in the upper part of her throat  - throat clearing and drinking water normally helps  -  frequency: all of the time   - pneumonias denies   - weight changes denies   - reflux denies  - GI work up denies  - imaging work up denies   - scope shows anterior commissure is not sharp, right vocal fold insertion is posterior and lower than the left vocal fold, very large glottic gap, uses ventricular phonation  - FEES shows mild vallecular residue with hard boiled egg, cleared with liquid wash    - symptoms due to muscle tension dysphagia    Plan   - safe swallow precaution      RETURN VISIT: as needed    Scribe Disclosure:   I, Eryn Howe, am serving as a scribe; to document services personally performed by June Mathews MD -based on data collection and the provider's statements to me.     Provider Disclosure:  I agree with above History, Review of Systems, Physical exam and Plan.  I have reviewed the content of the documentation and have edited it as needed. I have personally performed the services documented here and the documentation accurately represents those services and the decisions I have made.        June Mathews MD    Laryngology    [facility]  Department of  Otolaryngology - Head and Neck Surgery    [facility] [place] [address]  Appointment line: [phone]  Fax: [phone]  [email or web address]      15 minutes spent on the date of the encounter doing chart review, patient visit, and documentation

## 2024-01-25 NOTE — TELEPHONE ENCOUNTER
[facility] Call Center    Phone Message    May a detailed message be left on voicemail: yes     Reason for Call: Other: Pt said she missed a call from Dr Crane team and would like them to call her again.  [facility] location, thanks     Action Taken: Other: ENT    Travel Screening: Not Applicable

## 2024-02-02 ENCOUNTER — MYC MEDICAL ADVICE (OUTPATIENT)
Dept: OTOLARYNGOLOGY | Facility: CLINIC | Age: 34
End: 2024-02-02

## 2024-02-02 ENCOUNTER — VIRTUAL VISIT (OUTPATIENT)
Dept: OTOLARYNGOLOGY | Facility: CLINIC | Age: 34
End: 2024-02-02
Payer: COMMERCIAL

## 2024-02-02 ENCOUNTER — TELEPHONE (OUTPATIENT)
Dept: OTOLARYNGOLOGY | Facility: CLINIC | Age: 34
End: 2024-02-02

## 2024-02-02 DIAGNOSIS — R49.0 DYSPHONIA: Primary | ICD-10-CM

## 2024-02-02 DIAGNOSIS — J38.3 GLOTTIC INSUFFICIENCY: ICD-10-CM

## 2024-02-02 PROCEDURE — 92507 TX SP LANG VOICE COMM INDIV: CPT | Mod: GN | Performed by: SPEECH-LANGUAGE PATHOLOGIST

## 2024-02-02 NOTE — PROGRESS NOTES
Monserrat Moralez is a 33 year old female who is being evaluated via a billable video visit.      Monserrat has been notified and verbally consented to the following:     This video visit will be conducted between you and your provider.    Patient has opted to conduct today's video visit vs an in-person appointment.     Video visits are billed at different rates depending on your insurance coverage. Please reach out to your insurance provider with any questions.     If during the course of the call the provider feels the appointment is not appropriate, you will not be charged for this service.  Provider has received verbal consent for billable virtual visit from the patient? Yes  Will anyone else be joining your video visit? No    Call initiated at: 11:30 AM   Type of Visit Platform Used: Koogame Video  Location of provider: Home  Location of patient: [facility]  Harsh Mcfarland Jr., M.D., F.A.C.S.  Linda Sheldon M.D., M.P.H.  June Mathews M.D.  June Hale M.M. (voice), M.A., CCC-SLP  Tika Freeman M.S., CCC-SLP  Beata Jones, Ph.D., CCC-SLP  Phillip Fitch, Ph.D., CCC-SLP  Meme Abad M.S., CCC-SLP  Terrance Stafford M.M., M.A., CCC-SLP  ROBYN Hummel (voice), M.S., CCC-SLP    [facility]  VOICE/SPEECH/BREATHING THERAPY PROGRESS REPORT    Patient: Monserrat Moralez  Date of Service: 2/2/2024    Date of Last Service: 1/19/24  Referring physician: Dr. Mathews  Initial evaluation: 12/8/23  Therapy Session #4     I had the pleasure of seeing Ms. 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 Moralez worked on therapeutic activities to address the above diagnosis.  We devised a protocol of maneuvers to elicit true fold phonation, that she could test during her visit to Dr. Mathews.    Regarding practice, Ms. Moralez reports the following:     She is trying to do  "exercises, twice a day while at work and some other times, though it's hard to get in 5 sessions  o Hard to get privacy at work  o She does the puffed upper lip and the vowels    The voice gets stronger when she does the exercises, but then there is discomfort    She still does some breathing maneuvers as a warm-up, and finds it very helpful at work    Ms. Moralez also states that:    She was scheduled to see Dr. Mathews on 1/25, but missed the visit, and had a phone visit instead, so she was not able to gain information about the activity of her true vocal folds vs ventricular folds during various phonatory manuevers    The pain in the laryngeal area has calmed down; she thinks the exercises are helpful    Her voice doesn't seem as raspy and it is a little easier to be heard  o She hasn't tested her stamina yet    She is actually not sure if she would undergo this procedure again; she recognizes that the pain may have been caused by excess muscular effort    Her breathing is continuing to be better     Ms. Moralez presents today with the following:  Voice quality:    Rough, strained, irregular, and inconsistent, but more tone and louder than it has been in the past    Pitch is apparent on some utterances, it is WNL at around F#3 to C#4  Cough/ Throat clear:    none    THERAPEUTIC ACTIVITIES  Today Ms. Moralez participated in the following therapeutic activities:    Demonstrated previous exercises.  o On the puffed upper lip SOVT configuration and the \"whoo\" glides, she reports \"light stinging,\" not worse than in her normal spekaing voice, but \"different, and in a different place\"  - This did not subside as she continued to talk  o On inhalation phonation, she was only able to produce true vocal fold phonation at a very high pitch, and could not match it on exhalation phonation  - We will discontinue this exercise for now  o instruction provided for increased level of complexity/difficulty; see below    [place] a " "modified regimen for her exercises (see instructions in MyChart correspondence)  o She will concentrate on decreased effort, reduction of should tension, and a more limited pitch range (F#3 to C5)  o I tried to interpret her various novel sensations; she may be using muscles differently from what she has in the past  o Progress from \"whoo\" to \"who are you\" and other phrases  o I explained the difference between the puffed lip and hum exercises (she should continue to do both)  o We agreed that it is worthwhile for her to continue to practice these exercises and see how much improvement she can achieve in her voice  - It is important for her to have the laryngeal exam for feedback; I will facilitate this     IMPRESSIONS/GOALS/PLAN  Ms. 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    Goals for this practice period:     practice all exercises according to instructions    incorporate techniques into daily vocal activities    maintain vigilance for vocal technique    Plan: I will see Ms. Moralez in a month to work on education, modification, and carryover of therapeutic activities to more complex activities.    TOTAL SERVICE TIME: 45 minutes  TREATMENT (24694)      Beata Jones, Ph.D., [facility]-SLP  Speech-Language Pathologist  Director, [facility]  She/her/hers  [phone]              "

## 2024-02-02 NOTE — LETTER
2/2/2024       RE: Monserrat Moralez  [address] [facility] [address]     Dear Colleague,    Thank you for referring your patient, Monserrat Moralez, to the [facility]ICE [facility]. Please see a copy of my visit note below.    Monserrat Moralez is a 33 year old female who is being evaluated via a billable video visit.      Monserrat has been notified and verbally consented to the following:   This video visit will be conducted between you and your provider.  Patient has opted to conduct today's video visit vs an in-person appointment.   Video visits are billed at different rates depending on your insurance coverage. Please reach out to your insurance provider with any questions.   If during the course of the call the provider feels the appointment is not appropriate, you will not be charged for this service.  Provider has received verbal consent for billable virtual visit from the patient? Yes  Will anyone else be joining your video visit? No    Call initiated at: 11:30 AM   Type of Visit Platform Used: enVerid Video  Location of provider: Home  Location of patient: [facility]  Harsh Mcfarland Jr., M.D., F.A.C.S.  Linda Sheldon M.D., M.P.H.  June Mathews M.D.  June Hale M.M. (voice), M.A., CCC-SLP  Tika Freeman M.S., CCC-SLP  Beata Jones, Ph.D., CCC-SLP  Phillip Fitch, Ph.D., CCC-SLP  Meme Abad M.S., CCC-SLP  Terrance Stafford M.M., M.A., CCC-SLP  ROBYN Hummel (voice), M.S., CCC-SLP    [facility]  VOICE/SPEECH/BREATHING THERAPY PROGRESS REPORT    Patient: Monserrat Moralez  Date of Service: 2/2/2024    Date of Last Service: 1/19/24  Referring physician: Dr. Mathews  Initial evaluation: 12/8/23  Therapy Session #4     I had the pleasure of seeing Ms. Moralez today, for speech therapy to address a diagnosis of:  Dysphonia (R49.0)   Glottic Insufficiency (J38.3)    PROGRESS SINCE LAST  "SESSION  At the last session, Ms. Moralez worked on therapeutic activities to address the above diagnosis.  We devised a protocol of maneuvers to elicit true fold phonation, that she could test during her visit to Dr. Mathews.    Regarding practice, Ms. Moralez reports the following:   She is trying to do exercises, twice a day while at work and some other times, though it's hard to get in 5 sessions  Hard to get privacy at work  She does the puffed upper lip and the vowels  The voice gets stronger when she does the exercises, but then there is discomfort  She still does some breathing maneuvers as a warm-up, and finds it very helpful at work    Ms. Moralez also states that:  She was scheduled to see Dr. Mathews on 1/25, but missed the visit, and had a phone visit instead, so she was not able to gain information about the activity of her true vocal folds vs ventricular folds during various phonatory manuevers  The pain in the laryngeal area has calmed down; she thinks the exercises are helpful  Her voice doesn't seem as raspy and it is a little easier to be heard  She hasn't tested her stamina yet  She is actually not sure if she would undergo this procedure again; she recognizes that the pain may have been caused by excess muscular effort  Her breathing is continuing to be better     Ms. Moralez presents today with the following:  Voice quality:  Rough, strained, irregular, and inconsistent, but more tone and louder than it has been in the past  Pitch is apparent on some utterances, it is WNL at around F#3 to C#4  Cough/ Throat clear:  none    THERAPEUTIC ACTIVITIES  Today Ms. Moralez participated in the following therapeutic activities:  Demonstrated previous exercises.  On the puffed upper lip SOVT configuration and the \"whoo\" glides, she reports \"light stinging,\" not worse than in her normal spekaing voice, but \"different, and in a different place\"  This did not subside as she continued to talk  On inhalation " "phonation, she was only able to produce true vocal fold phonation at a very high pitch, and could not match it on exhalation phonation  We will discontinue this exercise for now  instruction provided for increased level of complexity/difficulty; see below  [place] a modified regimen for her exercises (see instructions in MyChart correspondence)  She will concentrate on decreased effort, reduction of should tension, and a more limited pitch range (F#3 to C5)  I tried to interpret her various novel sensations; she may be using muscles differently from what she has in the past  Progress from \"whoo\" to \"who are you\" and other phrases  I explained the difference between the puffed lip and hum exercises (she should continue to do both)  We agreed that it is worthwhile for her to continue to practice these exercises and see how much improvement she can achieve in her voice  It is important for her to have the laryngeal exam for feedback; I will facilitate this     IMPRESSIONS/GOALS/PLAN  Ms. 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    Goals for this practice period:   practice all exercises according to instructions  incorporate techniques into daily vocal activities  maintain vigilance for vocal technique    Plan: I will see Ms. Moralez in a month to work on education, modification, and carryover of therapeutic activities to more complex activities.    TOTAL SERVICE TIME: 45 minutes  TREATMENT (31883)      Beata Jones, Ph.D., [facility]-SLP  Speech-Language Pathologist  Director, [facility]  She/her/hers  [phone]                Again, thank you for allowing me to participate in the care of your patient.      Sincerely,    Beata Jones, SLP      "

## 2024-02-08 NOTE — PROGRESS NOTES
[facility]    Patient: Monserrat Moralez  Date of Visit: 2/9/2024    CHIEF COMPLAINT: Voice    HISTORY  Monserrat Moralez is a 33 year old year old woman, who was seen for follow-up evaluation.      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 Laryngeal evaluation shows significant ongoing changes to the anterior commissure with approximately 4 to 5 mm of space  the anterior most portion of the left and right vocal folds with likely vertical height mismatch.  Although true phonation is appreciated particularly with elevated pitch, primary source of phonation for communication happens via vibration of the ventricular folds.  This explains audio perceptual quality which is dominated by roughness, strain, and breathiness which is in turn echoed by laryngeal function studies which show substantially increased trans glottal airflow, profoundly increased in for subglottal pressure, and significantly elevated AVQI.  Beyond voice quality patient's episodic breathing difficulties during exertion likely have a role of paradoxical vocal fold motion, and she was stimulable for improvement in abduction with rescue breathing strategies (pursed lip breathing).     INTERVAL HISTORY:  12/11/2024-author Terrance Stafford-awake injection augmentation was attempted by Dr. Mathews but was unable to be completed    1/3/2024 - author Terrance Stafford -injection augmentation was completed under [facility] with anesthesia.  0.6 cc injected into the left true vocal fold 0.3 in the right.  Confirmed vertical  "height mismatch at the anterior commissure with left above right.    2/9/2024 - author: Terrance Rivera -patient was unable to be seen due to schedule confusion on 1/25/2024 in multidisciplinary clinic.  The need for formal reevaluation with biofeedback and stimulability testing was recommended by both Dr. Mathews and Beata Jones, PhD-SLP, and patient presents for this today.  She states that she has had moments of significantly improved vocal endurance and clarity particularly on Monday of this same week.  She does note that when she does her voice specific exercises she gets a focal pain on the left-hand side of her neck and it feels \"swollen\" internally.  This feels distinct from previous discomfort/fatigue at the end of days of typical voice use.    OBJECTIVE  PATIENT REPORTED MEASURES      2/9/2024     9:00 AM   Dysponia SLP Goals   How would you rate your speaking voice quality, if 0 is worst voice quality, and 10 is best voice? 7   How much does your voice problem bother you? Somewhat     Patient Supplied Answers To Last 2 VHI Questionnaires      2/2/2024    11:16 AM   Voice Handicap Index (VHI-10)   My voice makes it difficult for people to hear me 2   People have difficulty understanding me in a noisy room 2   My voice difficulties restrict my personal and social life.  4   I feel left out of conversations because of my voice 2   My voice problem causes me to lose income 4   I feel as though I have to strain to produce voice 4   The clarity of my voice is unpredictable 4   My voice problem upsets me 4   My voice makes me feel handicapped 4   People ask, \"What's wrong with your voice?\" 4   VHI-10 34     PERCEPTUAL EVALUATION (CPT 50451)  POSTURE / TENSION:   neck and shoulders    BREATHING:   Intermittent inspiratory noise during casual speech  Grossly normal with regards to lower versus upper breathing patterns    VOICE:  Roughness: Moderate Consistent  Breathiness: Mild to moderate Consistent  Strain: " Moderate to severe Consistent  Loudness  Conversational speech:  Mildly reduced  Pitch:  Conversational speech: Difficult to assess given the degree of roughness and strain, but informally judged to be roughly within normal limits  Pitch glide:   Increased strain with attempts at elevated pitch  Best vocal fold entrainment appreciated at low modal registration  Resonance:  Conversational speech:  laryngeal pharyngeal resonance    COUGH/THROAT CLEARING:  Occasional    LARYNGEAL EXAMINATION (CPT 16936)  Procedure: Flexible endoscopy with chip-tip technology without stroboscopy, right nostril; topical anesthesia with 3% Lidocaine and 0.25% phenylephrine was applied.   Performed by: Kevin Stafford M.S., CCC-SLP  The laryngeal and pharyngeal structures were evaluated for gross appearance, mobility, function, and focal lesions / abnormalities of the associated mucosa.  Stroboscopy was warranted to evaluate closure, symmetry, and vibratory characteristics of the vocal folds; however, this was not attempted today given inability to achieve adequate visualization of the true vocal folds behind supraglottic recruitment.  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  Left true vocal fold appears slightly full at the mid membranous juncture with slight whitish discoloration to the vibratory margin at that point versus persistent mucous that did not clear through exam  Right true vocal fold appears in keeping with earlier exam  With phonatory tasks there is severe to profound ventricular recruitment, the least supraglottic compression was noted on the following tasks  Semi occluded vocal tract exercises with large straw  Low hum  The above tasks also corresponding to best  vocal fold entrainment at lower modal pitch range  Inhalation phonation was  able to achieve glottic closure with clinician coaching (prior to exam), but patient experienced significant discomfort with the attempt, and it elicited gag during laryngeal exam and was not able to be trialed repeatedly        The laryngeal exam was reviewed with Ms. Moralez, and I provided pertinent explanations, as well as written and oral information.    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  Today she reports that there is some improvement in her voice quality following the surgery, and she notes particularly more consistency in her voice with the use of therapeutic exercises.  However she has developed left-sided discomfort/pain over the past month of practice.  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  It is small in size and located on the medial arytenoid wall and an area of significant compression/impact during attempts at true vocal fold phonation.  There is improved ability for the patient to achieve true vocal fold phonation, and low modal pitch with semi-occluded tasks (particularly large straw) were able to achieve lowest effort and visual hyperfunction.  Patient was stimulable using voice exercises for improved quality, but this must be balanced against the need for significant compensatory patterns of recruitment in light of the left vocal process granuloma.    The findings of today's evaluation will be reviewed with Beata Jones, PhD-SLP (patient's treating clinician) and the laryngology team to offer best guidance.  No current changes to plan of care.  Today's appointment is evaluation only.    This treatment plan was developed with the patient who  agreed with the recommendations.      TOTAL SERVICE TIME: 65 minutes  EVALUATION OF VOICE AND RESONANCE (06614)  ENDOSCOPIC LARYNGEAL EXAMINATION WITHOUT STROBOSCOPY (53741)  NO CHARGE FACILITY FEE (98628)    Kevin Stafford M.M., M.A., CCC-SLP  Speech-Language Pathologist  Certificate of Vocology  [phone]    *this report was created in part through the use of computerized dictation software, and though reviewed following completion, some typographic errors may persist.  If there is confusion regarding any of this notes contents, please contact me for clarification.*

## 2024-02-09 ENCOUNTER — OFFICE VISIT (OUTPATIENT)
Dept: OTOLARYNGOLOGY | Facility: CLINIC | Age: 34
End: 2024-02-09
Payer: COMMERCIAL

## 2024-02-09 DIAGNOSIS — J38.3 PARADOXICAL VOCAL FOLD MOVEMENT: ICD-10-CM

## 2024-02-09 DIAGNOSIS — R49.0 DYSPHONIA: Primary | ICD-10-CM

## 2024-02-09 DIAGNOSIS — Z98.890 HISTORY OF LARYNGOPLASTY: ICD-10-CM

## 2024-02-09 DIAGNOSIS — J38.3 GLOTTIC INSUFFICIENCY: ICD-10-CM

## 2024-02-09 PROCEDURE — 31579 LARYNGOSCOPY TELESCOPIC: CPT | Mod: 52 | Performed by: SPEECH-LANGUAGE PATHOLOGIST

## 2024-02-09 PROCEDURE — 92524 BEHAVRAL QUALIT ANALYS VOICE: CPT | Mod: GN | Performed by: SPEECH-LANGUAGE PATHOLOGIST

## 2024-02-09 NOTE — LETTER
2/9/2024       RE: Monserrat Moralez  [address] [facility] [address]     Dear Colleague,    Thank you for referring your patient, Monserrat Moralez, to the [facility] VOICE CLINIC [place] [facility]. Please see a copy of my visit note below.    [facility] VOICE [facility]    Patient: Monserrat Moralez  Date of Visit: 2/9/2024    CHIEF COMPLAINT: Voice    HISTORY  Monserrat Moralez is a 33 year old year old woman, who was seen for follow-up evaluation.      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 Laryngeal evaluation shows significant ongoing changes to the anterior commissure with approximately 4 to 5 mm of space  the anterior most portion of the left and right vocal folds with likely vertical height mismatch.  Although true phonation is appreciated particularly with elevated pitch, primary source of phonation for communication happens via vibration of the ventricular folds.  This explains audio perceptual quality which is dominated by roughness, strain, and breathiness which is in turn echoed by laryngeal function studies which show substantially increased trans glottal airflow, profoundly increased in for subglottal pressure, and significantly elevated AVQI.  Beyond voice quality patient's episodic breathing difficulties during exertion likely have a role of paradoxical vocal fold motion, and she was stimulable for improvement in abduction with rescue breathing strategies (pursed lip breathing).     INTERVAL  "HISTORY:  12/11/2024-author Terrance Stafford-awake injection augmentation was attempted by Dr. Mathews but was unable to be completed    1/3/2024 - author Terrance Stafford -injection augmentation was completed under [facility] with anesthesia.  0.6 cc injected into the left true vocal fold 0.3 in the right.  Confirmed vertical height mismatch at the anterior commissure with left above right.    2/9/2024 - author: Terrance Stafford -patient was unable to be seen due to schedule confusion on 1/25/2024 in multidisciplinary clinic.  The need for formal reevaluation with biofeedback and stimulability testing was recommended by both Dr. Mathews and Beata Jones, PhD-SLP, and patient presents for this today.  She states that she has had moments of significantly improved vocal endurance and clarity particularly on Monday of this same week.  She does note that when she does her voice specific exercises she gets a focal pain on the left-hand side of her neck and it feels \"swollen\" internally.  This feels distinct from previous discomfort/fatigue at the end of days of typical voice use.    OBJECTIVE  PATIENT REPORTED MEASURES      2/9/2024     9:00 AM   Dysponia SLP Goals   How would you rate your speaking voice quality, if 0 is worst voice quality, and 10 is best voice? 7   How much does your voice problem bother you? Somewhat     Patient Supplied Answers To Last 2 VHI Questionnaires      2/2/2024    11:16 AM   Voice Handicap Index (VHI-10)   My voice makes it difficult for people to hear me 2   People have difficulty understanding me in a noisy room 2   My voice difficulties restrict my personal and social life.  4   I feel left out of conversations because of my voice 2   My voice problem causes me to lose income 4   I feel as though I have to strain to produce voice 4   The clarity of my voice is unpredictable 4   My voice problem upsets me 4   My voice makes me feel handicapped 4   People ask, \"What's wrong with your voice?\" 4   VHI-10 34 "     PERCEPTUAL EVALUATION (CPT 49088)  POSTURE / TENSION:   neck and shoulders    BREATHING:   Intermittent inspiratory noise during casual speech  Grossly normal with regards to lower versus upper breathing patterns    VOICE:  Roughness: Moderate Consistent  Breathiness: Mild to moderate Consistent  Strain: Moderate to severe Consistent  Loudness  Conversational speech:  Mildly reduced  Pitch:  Conversational speech: Difficult to assess given the degree of roughness and strain, but informally judged to be roughly within normal limits  Pitch glide:   Increased strain with attempts at elevated pitch  Best vocal fold entrainment appreciated at low modal registration  Resonance:  Conversational speech:  laryngeal pharyngeal resonance    COUGH/THROAT CLEARING:  Occasional    LARYNGEAL EXAMINATION (CPT 47329)  Procedure: Flexible endoscopy with chip-tip technology without stroboscopy, right nostril; topical anesthesia with 3% Lidocaine and 0.25% phenylephrine was applied.   Performed by: Kevin Stafford M.S., CCC-SLP  The laryngeal and pharyngeal structures were evaluated for gross appearance, mobility, function, and focal lesions / abnormalities of the associated mucosa.  Stroboscopy was warranted to evaluate closure, symmetry, and vibratory characteristics of the vocal folds; however, this was not attempted today given inability to achieve adequate visualization of the true vocal folds behind supraglottic recruitment.  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  Left true vocal fold appears slightly full at the mid membranous juncture with slight whitish discoloration to the vibratory margin at that point versus persistent mucous that did not clear through exam  Right true vocal fold appears in keeping with earlier exam  With phonatory  tasks there is severe to profound ventricular recruitment, the least supraglottic compression was noted on the following tasks  Semi occluded vocal tract exercises with large straw  Low hum  The above tasks also corresponding to best  vocal fold entrainment at lower modal pitch range  Inhalation phonation was able to achieve glottic closure with clinician coaching (prior to exam), but patient experienced significant discomfort with the attempt, and it elicited gag during laryngeal exam and was not able to be trialed repeatedly        The laryngeal exam was reviewed with Ms. Moralez, and I provided pertinent explanations, as well as written and oral information.    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  Today she reports that there is some improvement in her voice quality following the surgery, and she notes particularly more consistency in her voice with the use of therapeutic exercises.  However she has developed left-sided discomfort/pain over the past month of practice.  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  It is small in size and located on the medial arytenoid wall and an area of significant compression/impact during attempts at true vocal fold phonation.  There is improved ability for the patient to achieve true vocal fold phonation, and low modal pitch with semi-occluded tasks (particularly large straw) were able to achieve lowest effort and visual hyperfunction.  Patient was stimulable using voice exercises for improved quality, but this must be balanced against the need for significant compensatory patterns of recruitment in light of the left vocal process  granuloma.    The findings of today's evaluation will be reviewed with Beata Jones, PhD-SLP (patient's treating clinician) and the laryngology team to offer best guidance.  No current changes to plan of care.  Today's appointment is evaluation only.    This treatment plan was developed with the patient who agreed with the recommendations.      TOTAL SERVICE TIME: 65 minutes  EVALUATION OF VOICE AND RESONANCE (65765)  ENDOSCOPIC LARYNGEAL EXAMINATION WITHOUT STROBOSCOPY (25316)  NO CHARGE FACILITY FEE (32775)    Kevin Stafford M.M., M.A., CCC-SLP  Speech-Language Pathologist  Certificate of Vocology  [phone]    *this report was created in part through the use of computerized dictation software, and though reviewed following completion, some typographic errors may persist.  If there is confusion regarding any of this notes contents, please contact me for clarification.*      Again, thank you for allowing me to participate in the care of your patient.      Sincerely,    Kevin Stafford, SLP

## 2024-02-12 DIAGNOSIS — K21.9 ESOPHAGEAL REFLUX: Primary | ICD-10-CM

## 2024-02-12 RX ORDER — OMEPRAZOLE 40 MG/1
40 CAPSULE, DELAYED RELEASE ORAL DAILY
Qty: 30 CAPSULE | Refills: 3 | Status: SHIPPED | OUTPATIENT
Start: 2024-02-12 | End: 2024-03-13

## 2024-03-01 ENCOUNTER — VIRTUAL VISIT (OUTPATIENT)
Dept: OTOLARYNGOLOGY | Facility: CLINIC | Age: 34
End: 2024-03-01
Payer: COMMERCIAL

## 2024-03-01 DIAGNOSIS — R49.0 DYSPHONIA: Primary | ICD-10-CM

## 2024-03-01 DIAGNOSIS — J38.3 GLOTTIC INSUFFICIENCY: ICD-10-CM

## 2024-03-01 PROCEDURE — 92507 TX SP LANG VOICE COMM INDIV: CPT | Mod: GN | Performed by: SPEECH-LANGUAGE PATHOLOGIST

## 2024-03-01 NOTE — LETTER
3/1/2024       RE: Monserrat Moralez  [address] [facility] [address] [facility] [address]     Dear Colleague,    Thank you for referring your patient, Monserrat Moralez, to the [facility] VOICE CLINIC [facility]. Please see a copy of my visit note below.    Monserrat Moralez is a 33 year old female who is being evaluated via a billable video visit.      Monserrat has been notified and verbally consented to the following:   This video visit will be conducted between you and your provider.  Patient has opted to conduct today's video visit vs an in-person appointment.   Video visits are billed at different rates depending on your insurance coverage. Please reach out to your insurance provider with any questions.   If during the course of the call the provider feels the appointment is not appropriate, you will not be charged for this service.  Provider has received verbal consent for billable virtual visit from the patient? Yes  Will anyone else be joining your video visit? No    Call initiated at: 10:30 AM   Type of Visit Platform Used: [employer] Video  Location of provider: Home  Location of patient: [facility] VOICE [facility]  Harsh Mcfarland Jr., M.D., F.A.C.S.  Linda Sheldon M.D., M.P.H.  June Mathews M.D.  June Hale M.M. (voice), M.A., CCC-SLP  Tika Freeman M.S., CCC-SLP  Beata Jones, Ph.D., CCC-SLP  Phillip Fitch, Ph.D., CCC-SLP  Meme Abad M.S., CCC-SLP  Terrance Stafford M.M., M.A., CCC-SLP  ROBYN Hummel (voice), M.S., CCC-SLP    [facility]  VOICE/SPEECH/BREATHING THERAPY PROGRESS REPORT    Patient: Monserrat Moralez  Date of Service: 3/1/2024    Date of Last Service: 2/9/24 (with my associate, Terrance Stafford)  Referring physician: Dr. Mathews  Initial evaluation: 12/8/23  Therapy Session #6     I had the pleasure of seeing Ms. Moralez today, for speech therapy to address a diagnosis of:  Dysphonia (R49.0)   Glottic  "Insufficiency (J38.3)    PROGRESS SINCE LAST SESSION  At the last session, Ms. Moralez underwent laryngeal exam to help understand her best functional strategies.  This exam also showed an arytenoid granuloma.  Along with  Rivera, she worked on therapeutic activities to address the above diagnosis.    Regarding practice, Ms. Moralez reports the following:   No practice for a while, to let her voice rest and get the pain to go away  She started up again with bubbles and humming occasionally (maybe once every three days)  Her voice feels a bit more clear after a few minutes of humming  She's not doing the bubbles often enough to notice a difference  She did notice when she was doing them a few times per day; there was an improvement     Ms. Moralez also states that:  Her breathing is going fine, but she's not taxing her body as much  She not trying to do anythng different in her talking, but it does feel that \"the sides aren't jamming together as hard any more\" when she talks (since the procedure), but it isn't as easy to make her tone clear and loud as is was a couple weeks ago  She agrees it's hard to know how much the effect of the injection VS EXERCISES  She thinks that when she saw Dr. Mathews for follow-up, she was still pushing her voice, and was dealing with the granuloma without knowing it, and therefore didn't think the injection was very helpful; she is now re-thinking this  Now that her technique is better, she realizes the injection was helpful, and she may be interested in the fat injection  She has occasionally been taking reflux medication; she took it for 4-5 days straight two weeks ago, and there was noticeable improvement in reflux symptoms (she did have a revelation of understanding the symptom of refluxing); now she is maintaining at two days per week     Ms. Moralez presents today with the following:  Some secretion noise  Voice quality:  Moderate roughness and strain, but improved tone over " "her early sessions; more apparent periodicity  There is apparent underlying pitch, at a normal range of G3 t D4  Cough/ Throat clear:  None observed    THERAPEUTIC ACTIVITIES  Today Ms. Moralez participated in the following therapeutic activities:  Demonstrated previous exercises.  demonstrated more tone to the voice and clarity of quality in the straw phonation exercises with water resistance  She is able to produce an A4 with reasonably good clarity, and an A5 with minimal duration or volume  Her humming is similar, but with increased noise component, in the range from G3 to D4  She states she is thinking about the up and down in pitch, but forgot about the sensation of opening the throat using the \"hot baked potato\" image  appropriate redirection provided; see below  Practiced exercises for improved sensation of open throat during phonation.  Thinking of the \"inhale around a hot baked potato\" image did p rovide a wider pitch range and \"didn't feel slammy\"  Progressing to a \"whoo\" resulted in less steady phonation but no discomfort  [place] exercises to experience a more forward sensation during phonation.  speech material with nasal continuants was facilitating, thought she needed to understand the rationale, and practice with a great deal of cueing  good learning, but will need practice  I provided an after visit summary to help facilitate practice.    IMPRESSIONS/GOALS/PLAN  Ms. 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     She will continue to work on her exercises on a daily basis, and work on incorporating the techniques into her daily activities.    Plan: I will see Ms. Moralez in three weeks to work on education, modification, and carryover of therapeutic activities " to more complex activities.    TOTAL SERVICE TIME: 58 minutes  TREATMENT (64336)      Beata Jones, Ph.D., [facility]-SLP  Speech-Language Pathologist  Director, [facility]  She/her/hers  [phone]                Again, thank you for allowing me to participate in the care of your patient.      Sincerely,    Beata Jones, SLP

## 2024-03-01 NOTE — PROGRESS NOTES
Monserrat Moralez is a 33 year old female who is being evaluated via a billable video visit.      Monserrat has been notified and verbally consented to the following:     This video visit will be conducted between you and your provider.    Patient has opted to conduct today's video visit vs an in-person appointment.     Video visits are billed at different rates depending on your insurance coverage. Please reach out to your insurance provider with any questions.     If during the course of the call the provider feels the appointment is not appropriate, you will not be charged for this service.  Provider has received verbal consent for billable virtual visit from the patient? Yes  Will anyone else be joining your video visit? No    Call initiated at: 10:30 AM   Type of Visit Platform Used: [employer] Video  Location of provider: Home  Location of patient: [facility] VOICE [facility]  Harsh Mcfarland Jr., M.D., F.A.C.S.  Linda Sheldon M.D., M.P.H.  June Mathews M.D.  June Hale M.M. (voice), M.A., CCC-SLP  Tika Freeman M.S., CCC-SLP  Beata Jones, Ph.D., CCC-SLP  Phillip Fitch, Ph.D., CCC-SLP  Meme Abad M.S., CCC-SLP  Terrance Stafford M.M., M.A., CCC-SLP  ROBYN Hummel (voice), M.S., CCC-SLP    [facility]  VOICE/SPEECH/BREATHING THERAPY PROGRESS REPORT    Patient: Monserrat Moralez  Date of Service: 3/1/2024    Date of Last Service: 2/9/24 (with my associate, Terrance Stafford)  Referring physician: Dr. Mathews  Initial evaluation: 12/8/23  Therapy Session #6     I had the pleasure of seeing Ms. 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 Moralez underwent laryngeal exam to help understand her best functional strategies.  This exam also showed an arytenoid granuloma.  Along with Mr. Stafford, she worked on therapeutic activities to address the above  "diagnosis.    Regarding practice, Ms. Moralez reports the following:     No practice for a while, to let her voice rest and get the pain to go away    She started up again with bubbles and humming occasionally (maybe once every three days)  o Her voice feels a bit more clear after a few minutes of humming  o She's not doing the bubbles often enough to notice a difference  - She did notice when she was doing them a few times per day; there was an improvement     Ms. Moralez also states that:    Her breathing is going fine, but she's not taxing her body as much    She not trying to do anythng different in her talking, but it does feel that \"the sides aren't jamming together as hard any more\" when she talks (since the procedure), but it isn't as easy to make her tone clear and loud as is was a couple weeks ago  o She agrees it's hard to know how much the effect of the injection VS EXERCISES  o She thinks that when she saw Dr. Mathews for follow-up, she was still pushing her voice, and was dealing with the granuloma without knowing it, and therefore didn't think the injection was very helpful; she is now re-thinking this  - Now that her technique is better, she realizes the injection was helpful, and she may be interested in the fat injection    She has occasionally been taking reflux medication; she took it for 4-5 days straight two weeks ago, and there was noticeable improvement in reflux symptoms (she did have a revelation of understanding the symptom of refluxing); now she is maintaining at two days per week     Ms. Moralez presents today with the following:    Some secretion noise  Voice quality:    Moderate roughness and strain, but improved tone over her early sessions; more apparent periodicity    There is apparent underlying pitch, at a normal range of G3 t D4  Cough/ Throat clear:    None observed    THERAPEUTIC ACTIVITIES  Today Ms. Moralez participated in the following therapeutic activities:    Demonstrated " "previous exercises.  o demonstrated more tone to the voice and clarity of quality in the straw phonation exercises with water resistance  o She is able to produce an A4 with reasonably good clarity, and an A5 with minimal duration or volume  o Her humming is similar, but with increased noise component, in the range from G3 to D4  o She states she is thinking about the up and down in pitch, but forgot about the sensation of opening the throat using the \"hot baked potato\" image  o appropriate redirection provided; see below    Practiced exercises for improved sensation of open throat during phonation.  o Thinking of the \"inhale around a hot baked potato\" image did p rovide a wider pitch range and \"didn't feel slammy\"  o Progressing to a \"whoo\" resulted in less steady phonation but no discomfort    [place] exercises to experience a more forward sensation during phonation.  o speech material with nasal continuants was facilitating, thought she needed to understand the rationale, and practice with a great deal of cueing  o good learning, but will need practice    I provided an after visit summary to help facilitate practice.    IMPRESSIONS/GOALS/PLAN  Ms. 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     She will continue to work on her exercises on a daily basis, and work on incorporating the techniques into her daily activities.    Plan: I will see Ms. Moralez in three weeks to work on education, modification, and carryover of therapeutic activities to more complex activities.    TOTAL SERVICE TIME: 58 minutes  TREATMENT (22633)      Baeta Jones, Ph.D., [facility]-SLP  Speech-Language Pathologist  Director, [facility]  She/her/hers  [phone]              "

## 2024-03-04 ENCOUNTER — TELEPHONE (OUTPATIENT)
Dept: OTOLARYNGOLOGY | Facility: CLINIC | Age: 34
End: 2024-03-04
Payer: COMMERCIAL

## 2024-03-04 NOTE — PATIENT INSTRUCTIONS
"  After Visit Summary    Patient: Monserrat Moralez  Date of Visit: 3/1/2024      Voice exercises:  Keep practicing the bubble exercises; go up and down in pitch  Also practice up and down glides on \"whoo\"  Remember to think of inhaling around that hot baked potato as you take your breath  Or you can think of half a yawn; anything to help you feel a more open throat:    m's: \"[id number]\"  Sense of voice in the face, not in the throat  Go up and down, but just within your speaking pitch range    Practice a few minutes at a time, 5 times per day  You want to learn to get your false vocal folds out of the way while there is still voice gel in your vocal folds and you can get them to come together; you're trying to get rid of \"the slammy feeling\"        "

## 2024-03-04 NOTE — TELEPHONE ENCOUNTER
LVM to schedule 2 more virtual therapy sessions with me - one in March and one in April (or both in April if nothing is available in March)?- 2 weeks apart   Also please schedule an in-person appointment with Terrance next available and add to waitlist      Gave call center number

## 2024-07-27 ENCOUNTER — HEALTH MAINTENANCE LETTER (OUTPATIENT)
Age: 34
End: 2024-07-27

## 2024-11-06 ENCOUNTER — HOSPITAL ENCOUNTER (EMERGENCY)
Facility: CLINIC | Age: 34
Discharge: HOME | End: 2024-11-06
Attending: EMERGENCY MEDICINE | Admitting: EMERGENCY MEDICINE

## 2024-11-06 VITALS
DIASTOLIC BLOOD PRESSURE: 81 MMHG | TEMPERATURE: 98.8 F | WEIGHT: 181.88 LBS | BODY MASS INDEX: 31.05 KG/M2 | HEIGHT: 64 IN | SYSTOLIC BLOOD PRESSURE: 116 MMHG | RESPIRATION RATE: 18 BRPM | HEART RATE: 81 BPM | OXYGEN SATURATION: 98 %

## 2024-11-06 DIAGNOSIS — R20.2 PARESTHESIA: ICD-10-CM

## 2024-11-06 DIAGNOSIS — M54.9 UPPER BACK PAIN ON LEFT SIDE: ICD-10-CM

## 2024-11-06 PROCEDURE — 99282 EMERGENCY DEPT VISIT SF MDM: CPT

## 2024-11-06 ASSESSMENT — COLUMBIA-SUICIDE SEVERITY RATING SCALE - C-SSRS
6. HAVE YOU EVER DONE ANYTHING, STARTED TO DO ANYTHING, OR PREPARED TO DO ANYTHING TO END YOUR LIFE?: NO
2. HAVE YOU ACTUALLY HAD ANY THOUGHTS OF KILLING YOURSELF IN THE PAST MONTH?: NO
1. IN THE PAST MONTH, HAVE YOU WISHED YOU WERE DEAD OR WISHED YOU COULD GO TO SLEEP AND NOT WAKE UP?: NO

## 2024-11-06 ASSESSMENT — ACTIVITIES OF DAILY LIVING (ADL): ADLS_ACUITY_SCORE: 0

## 2024-11-06 NOTE — Clinical Note
Monserrat Moralez was seen and treated in our emergency department on 11/6/2024.  She may return to work on 11/08/2024.  Ms. Moralez may return to work on limited duty while her injury heals.     If you have any questions or concerns, please don't hesitate to call.      Pete Cai MD

## 2024-11-06 NOTE — ED PROVIDER NOTES
"  Emergency Department Note      History of Present Illness     Chief Complaint   Back Pain      HPI   Monserrat Moralez is a 34 year old female who presents with upper back pain.  The patient states that she strained her upper back at work on Saturday.  She works at [employer].  States that since then the pain has been ongoing and has intermittent radiation down the left arm.  The pain is not different or worse today, she was advised by family to come into the ER because of the persistent symptoms.  Fever, no IV drug use, no history of cancer.  No left arm injury. Ambulating difficulty and no lower extremity symptoms.    Independent Historian   None    Review of External Notes   None    Past Medical History     Medical History and Problem List   No past medical history on file.    Medications   acetaminophen (TYLENOL) 325 MG tablet  ondansetron (ZOFRAN ODT) 4 MG ODT tab        Surgical History   Past Surgical History:   Procedure Laterality Date    LARYNGOSCOPY N/A 1/3/2024    Procedure: MICROLARYNGOSCOPY with vocal fold injection with voice gel;  Surgeon: June Mathews MD;  Location: [facility] OR    LARYNGOSCOPY, FLEXIBLE WITH INJECTION N/A 12/11/2023    Procedure: LARYNGOSCOPY, FLEXIBLE WITH INJECTION;  Surgeon: June Mathews MD;  Location: [facility] OR       Physical Exam     Patient Vitals for the past 24 hrs:   BP Temp Temp src Pulse Resp SpO2 Height Weight   11/06/24 1211 116/81 98.8  F (37.1  C) Temporal 81 18 98 % 1.613 m (5' 3.5\") 82.5 kg (181 lb 14.1 oz)     Physical Exam  General: Sitting on the ED chair  HEENT: Normocephalic, atraumatic  Cardiac: Warm and well perfused  Pulm: Breathing comfortably, no accessory muscle usage, no conversational dyspnea  MSK: No bony deformities, no bony deformity or instability of the left upper extremity, motor intact to the bilateral distal median/radial/ulnar nerves, sensation is present but decreased over the distal left ulnar nerve, sensation otherwise intact to the distal " bilateral dermatomes, mild tenderness to palpation over the upper left thoracic paraspinal musculature, C-spine nontender, thoracic spine nontender  Skin: Warm and dry  Neuro: Moves all extremities  Psych: Pleasant mood and affect      Diagnostics     Independent Interpretation   None    ED Course      Medications Administered   Medications - No data to display    Procedures   Procedures     Discussion of Management   None    ED Course        Additional Documentation  None    Medical Decision Making / Diagnosis     CMS Diagnoses: None    MIPS       None    MDM   Monserrat Moralez is a 34 year old female presents with upper back pain and paresthesias as described above.  Exam is reassuring and most consistent with either an ulnar neuropathy or radicular pain from vertebra.  The spine is nontender and there is no significant trauma history, no indication for imaging.  Vital signs are stable.  No IV drug use or other red flags as far as back pain goes.  Appropriate for further management in the outpatient setting.  Advised the patient that NSAIDs are okay in the short-term, also can add acetaminophen.  Recommended that she continue to monitor for the next couple of weeks and follow-up with her primary care provider.  RTED for worsening symptoms.     Disposition   The patient was discharged.     Diagnosis     ICD-10-CM    1. Upper back pain on left side  M54.9       2. Paresthesia  R20.2                MD Trell COTO Colin, MD  11/06/24 9425

## 2024-11-06 NOTE — ED TRIAGE NOTES
Pt arrives with c/o back pain for 1 week starting after straining her back at work. Pt reports she is concerned because of continued pain and having to take ibuprofen.      Triage Assessment (Adult)       Row Name 11/06/24 1210          Triage Assessment    Airway WDL WDL        Respiratory WDL    Respiratory WDL WDL        Skin Circulation/Temperature WDL    Skin Circulation/Temperature WDL WDL        Cardiac WDL    Cardiac WDL WDL        Peripheral/Neurovascular WDL    Peripheral Neurovascular WDL WDL        Cognitive/Neuro/Behavioral WDL    Cognitive/Neuro/Behavioral WDL WDL

## 2025-08-10 ENCOUNTER — HEALTH MAINTENANCE LETTER (OUTPATIENT)
Age: 35
End: 2025-08-10

## (undated) DEVICE — SUCTION MANIFOLD NEPTUNE 2 SYS 4 PORT 0702-020-000

## (undated) DEVICE — PACK ENT ENDOSCOPY UMMC

## (undated) DEVICE — NDL SCLEROTHERAPY 1.8MM 26GA 200CM M00518160

## (undated) DEVICE — JELLY LUBRICATING SURGILUBE 2OZ TUBE

## (undated) DEVICE — NDL 18GA 1.5" 305196

## (undated) DEVICE — SPONGE COTTONOID 1/2X3" 20-07S

## (undated) DEVICE — SOL WATER IRRIG 1000ML BOTTLE 2F7114

## (undated) DEVICE — GLOVE PROTEXIS POWDER FREE SMT 6.5  2D72PT65X

## (undated) DEVICE — DRSG GAUZE 4X4" 3033

## (undated) DEVICE — NDL 30GA 0.5" 305106

## (undated) DEVICE — ENDO TOOTH QUICK GUARD CONFORMING PROTECTION

## (undated) DEVICE — GLOVE BIOGEL PI ULTRATOUCH SZ 6.5 41165

## (undated) DEVICE — IMP VOCAL CORD PROLARYN GEL INJ 1ML 8602MOK5: Type: IMPLANTABLE DEVICE | Site: THROAT | Status: NON-FUNCTIONAL

## (undated) DEVICE — PEN MARKING SKIN W/LABELS 31145884

## (undated) DEVICE — TUBING SUCTION 10'X3/16" N510

## (undated) DEVICE — ENDO VALVE SUCTION BRONCH EVIS MAJ-209

## (undated) DEVICE — SOL WATER IRRIG 500ML BOTTLE 2F7113

## (undated) DEVICE — ENDO VALVE BX EVIS MAJ-210

## (undated) DEVICE — SYR 10ML SLIP TIP W/O NDL 303134

## (undated) DEVICE — LINEN TOWEL PACK X5 5464

## (undated) DEVICE — SYR 01ML 27GA 0.5" NDL TBC 309623

## (undated) DEVICE — SYR 03ML LL W/O NDL 309657

## (undated) DEVICE — KIT ENDO FIRST STEP DISINFECTANT 200ML W/POUCH EP-4

## (undated) DEVICE — ANTIFOG SOLUTION W/FOAM PAD 31142527

## (undated) DEVICE — SOL NACL 0.9% IRRIG 1000ML BOTTLE 2F7124

## (undated) DEVICE — CUP AND LID 2PK 2OZ STERILE  SSK9006A

## (undated) DEVICE — SUCTION TIP YANKAUER W/O VENT K86

## (undated) RX ORDER — FENTANYL CITRATE 50 UG/ML
INJECTION, SOLUTION INTRAMUSCULAR; INTRAVENOUS
Status: DISPENSED
Start: 2024-01-03

## (undated) RX ORDER — LIDOCAINE HYDROCHLORIDE 20 MG/ML
JELLY TOPICAL
Status: DISPENSED
Start: 2023-12-11

## (undated) RX ORDER — OXYMETAZOLINE HYDROCHLORIDE 0.05 G/100ML
SPRAY NASAL
Status: DISPENSED
Start: 2024-01-03

## (undated) RX ORDER — HYDROCODONE BITARTRATE AND ACETAMINOPHEN 5; 325 MG/1; MG/1
TABLET ORAL
Status: DISPENSED
Start: 2024-01-03

## (undated) RX ORDER — LIDOCAINE HYDROCHLORIDE 20 MG/ML
JELLY TOPICAL
Status: DISPENSED
Start: 2023-12-08